# Patient Record
Sex: FEMALE | Race: WHITE | Employment: OTHER | URBAN - METROPOLITAN AREA
[De-identification: names, ages, dates, MRNs, and addresses within clinical notes are randomized per-mention and may not be internally consistent; named-entity substitution may affect disease eponyms.]

---

## 2018-01-15 ENCOUNTER — TELEPHONE (OUTPATIENT)
Dept: INTERNAL MEDICINE CLINIC | Facility: CLINIC | Age: 73
End: 2018-01-15

## 2018-02-22 ENCOUNTER — TELEPHONE (OUTPATIENT)
Dept: INTERNAL MEDICINE CLINIC | Facility: CLINIC | Age: 73
End: 2018-02-22

## 2018-02-22 NOTE — TELEPHONE ENCOUNTER
I left a voice message for Ms. Ribera that it was time for her annual physical and left our office phone number

## 2018-05-23 ENCOUNTER — TELEPHONE (OUTPATIENT)
Dept: INTERNAL MEDICINE CLINIC | Facility: CLINIC | Age: 73
End: 2018-05-23

## 2018-05-23 NOTE — TELEPHONE ENCOUNTER
5/23/18  MsLiss  Marychuytha was not able to commit to an appointment date today - she will call the office within the next 1-2 weeks to make the appt

## 2018-07-03 ENCOUNTER — APPOINTMENT (OUTPATIENT)
Dept: ULTRASOUND IMAGING | Facility: HOSPITAL | Age: 73
DRG: 417 | End: 2018-07-03
Attending: EMERGENCY MEDICINE
Payer: MEDICARE

## 2018-07-03 ENCOUNTER — HOSPITAL ENCOUNTER (INPATIENT)
Facility: HOSPITAL | Age: 73
LOS: 4 days | Discharge: HOME | DRG: 417 | End: 2018-07-07
Attending: EMERGENCY MEDICINE | Admitting: HOSPITALIST
Payer: MEDICARE

## 2018-07-03 ENCOUNTER — APPOINTMENT (OUTPATIENT)
Dept: MRI IMAGING | Facility: HOSPITAL | Age: 73
DRG: 417 | End: 2018-07-03
Attending: EMERGENCY MEDICINE
Payer: MEDICARE

## 2018-07-03 DIAGNOSIS — K85.10 ACUTE BILIARY PANCREATITIS, UNSPECIFIED COMPLICATION STATUS: ICD-10-CM

## 2018-07-03 DIAGNOSIS — K80.50 CHOLEDOCHOLITHIASIS: ICD-10-CM

## 2018-07-03 DIAGNOSIS — K81.0 ACUTE CHOLECYSTITIS: Primary | ICD-10-CM

## 2018-07-03 LAB
ALBUMIN SERPL BCP-MCNC: 3.1 G/DL (ref 3.5–4.8)
ALP SERPL-CCNC: 607 U/L (ref 32–100)
ALT SERPL-CCNC: 124 U/L (ref 14–54)
ANION GAP SERPL CALC-SCNC: 16 MMOL/L (ref 0–18)
AST SERPL-CCNC: 101 U/L (ref 15–41)
BASOPHILS # BLD: 0 K/UL (ref 0–0.2)
BASOPHILS NFR BLD: 0 %
BILIRUB DIRECT SERPL-MCNC: 3.4 MG/DL (ref 0–0.2)
BILIRUB SERPL-MCNC: 5.4 MG/DL (ref 0.3–1.2)
BUN SERPL-MCNC: 29 MG/DL (ref 8–20)
BUN/CREAT SERPL: 23.8 (ref 10–20)
CALCIUM SERPL-MCNC: 9.3 MG/DL (ref 8.5–10.5)
CHLORIDE SERPL-SCNC: 101 MMOL/L (ref 95–110)
CO2 SERPL-SCNC: 18 MMOL/L (ref 22–32)
CREAT SERPL-MCNC: 1.22 MG/DL (ref 0.5–1.5)
EOSINOPHIL # BLD: 0 K/UL (ref 0–0.7)
EOSINOPHIL NFR BLD: 0 %
ERYTHROCYTE [DISTWIDTH] IN BLOOD BY AUTOMATED COUNT: 15.7 % (ref 11–15)
GLUCOSE SERPL-MCNC: 140 MG/DL (ref 70–99)
HCT VFR BLD AUTO: 42.6 % (ref 35–48)
HGB BLD-MCNC: 14.1 G/DL (ref 12–16)
LIPASE SERPL-CCNC: 579 U/L (ref 22–51)
LYMPHOCYTES # BLD: 0.2 K/UL (ref 1–4)
LYMPHOCYTES NFR BLD: 5 %
MCH RBC QN AUTO: 28.9 PG (ref 27–32)
MCHC RBC AUTO-ENTMCNC: 33.1 G/DL (ref 32–37)
MCV RBC AUTO: 87.5 FL (ref 80–100)
MONOCYTES # BLD: 0 K/UL (ref 0–1)
MONOCYTES NFR BLD: 1 %
NEUTROPHILS # BLD AUTO: 3.6 K/UL (ref 1.8–7.7)
NEUTROPHILS NFR BLD: 94 %
OSMOLALITY UR CALC.SUM OF ELEC: 288 MOSM/KG (ref 275–295)
PLATELET # BLD AUTO: 148 K/UL (ref 140–400)
PMV BLD AUTO: 9 FL (ref 7.4–10.3)
POTASSIUM SERPL-SCNC: 2.8 MMOL/L (ref 3.3–5.1)
PROT SERPL-MCNC: 7.4 G/DL (ref 5.9–8.4)
RBC # BLD AUTO: 4.87 M/UL (ref 3.7–5.4)
SODIUM SERPL-SCNC: 135 MMOL/L (ref 136–144)
WBC # BLD AUTO: 3.8 K/UL (ref 4–11)

## 2018-07-03 PROCEDURE — 99222 1ST HOSP IP/OBS MODERATE 55: CPT | Performed by: INTERNAL MEDICINE

## 2018-07-03 PROCEDURE — 74183 MRI ABD W/O CNTR FLWD CNTR: CPT | Performed by: EMERGENCY MEDICINE

## 2018-07-03 PROCEDURE — 76376 3D RENDER W/INTRP POSTPROCES: CPT | Performed by: EMERGENCY MEDICINE

## 2018-07-03 PROCEDURE — 76705 ECHO EXAM OF ABDOMEN: CPT | Performed by: EMERGENCY MEDICINE

## 2018-07-03 PROCEDURE — 99223 1ST HOSP IP/OBS HIGH 75: CPT | Performed by: SURGERY

## 2018-07-03 PROCEDURE — 99223 1ST HOSP IP/OBS HIGH 75: CPT | Performed by: HOSPITALIST

## 2018-07-03 RX ORDER — SODIUM CHLORIDE 0.9 % (FLUSH) 0.9 %
3 SYRINGE (ML) INJECTION AS NEEDED
Status: DISCONTINUED | OUTPATIENT
Start: 2018-07-03 | End: 2018-07-05 | Stop reason: HOSPADM

## 2018-07-03 RX ORDER — SODIUM CHLORIDE 9 MG/ML
INJECTION, SOLUTION INTRAVENOUS
Status: COMPLETED
Start: 2018-07-03 | End: 2018-07-03

## 2018-07-03 RX ORDER — HYDROMORPHONE HYDROCHLORIDE 1 MG/ML
0.8 INJECTION, SOLUTION INTRAMUSCULAR; INTRAVENOUS; SUBCUTANEOUS EVERY 2 HOUR PRN
Status: DISCONTINUED | OUTPATIENT
Start: 2018-07-03 | End: 2018-07-05 | Stop reason: HOSPADM

## 2018-07-03 RX ORDER — HYDROMORPHONE HYDROCHLORIDE 1 MG/ML
0.4 INJECTION, SOLUTION INTRAMUSCULAR; INTRAVENOUS; SUBCUTANEOUS EVERY 2 HOUR PRN
Status: DISCONTINUED | OUTPATIENT
Start: 2018-07-03 | End: 2018-07-05 | Stop reason: HOSPADM

## 2018-07-03 RX ORDER — DEXTROSE, SODIUM CHLORIDE, AND POTASSIUM CHLORIDE 5; .45; .15 G/100ML; G/100ML; G/100ML
INJECTION INTRAVENOUS CONTINUOUS
Status: DISCONTINUED | OUTPATIENT
Start: 2018-07-03 | End: 2018-07-05 | Stop reason: HOSPADM

## 2018-07-03 RX ORDER — ONDANSETRON 2 MG/ML
4 INJECTION INTRAMUSCULAR; INTRAVENOUS EVERY 6 HOURS PRN
Status: DISCONTINUED | OUTPATIENT
Start: 2018-07-03 | End: 2018-07-07

## 2018-07-03 RX ORDER — HYDROMORPHONE HYDROCHLORIDE 1 MG/ML
0.2 INJECTION, SOLUTION INTRAMUSCULAR; INTRAVENOUS; SUBCUTANEOUS EVERY 2 HOUR PRN
Status: DISCONTINUED | OUTPATIENT
Start: 2018-07-03 | End: 2018-07-05 | Stop reason: HOSPADM

## 2018-07-03 RX ORDER — ACETAMINOPHEN 325 MG/1
650 TABLET ORAL EVERY 6 HOURS PRN
Status: DISCONTINUED | OUTPATIENT
Start: 2018-07-03 | End: 2018-07-07

## 2018-07-03 NOTE — ED PROVIDER NOTES
Patient Seen in: [facility] Emergency Department    History   Patient presents with:  Abdomen/Flank Pain (GI/)    Stated Complaint: fever    HPI    Patient is a 72-year-old female who states that over the last several weeks she has been feeling Musculoskeletal: Normal range of motion. Neurological: She is alert and oriented to person, place, and time. Skin: Skin is warm and dry. No rash noted. Skin markedly icteric   Nursing note and vitals reviewed.           ED Course     Labs Reviewed   B ------------------------------------------------------------      [facility]     Admission disposition: 7/3/2018  6:10 PM                 Disposition and Plan     Clinical Impression:  Acute cholecystitis  (primary encounter diagnosis)  Choledocholithiasis  Acute
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
## 2018-07-03 NOTE — H&P
[facility]    PATIENT'S NAME: Kyleigh Ramesh   ATTENDING PHYSICIAN: Rosa Malin MD   PATIENT ACCOUNT#:   [id number]    LOCATION:  [place] [address]  MEDICAL RECORD #:   [id number]       YOB: 1945  ADMISSION DATE:       07/03/ review of systems is negative. PHYSICAL EXAMINATION:    GENERAL:  Alert. Oriented to time, place, and person. Moderate distress. VITAL SIGNS:  Temperature 98.4, pulse 75, respiratory rate 20, blood pressure 128/72, pulse ox 95% on room air.   HEENT

## 2018-07-04 ENCOUNTER — APPOINTMENT (OUTPATIENT)
Dept: GENERAL RADIOLOGY | Facility: HOSPITAL | Age: 73
DRG: 417 | End: 2018-07-04
Attending: INTERNAL MEDICINE
Payer: MEDICARE

## 2018-07-04 ENCOUNTER — ANESTHESIA (OUTPATIENT)
Dept: ENDOSCOPY | Facility: HOSPITAL | Age: 73
DRG: 417 | End: 2018-07-04
Payer: MEDICARE

## 2018-07-04 ENCOUNTER — ANESTHESIA EVENT (OUTPATIENT)
Dept: ENDOSCOPY | Facility: HOSPITAL | Age: 73
DRG: 417 | End: 2018-07-04
Payer: MEDICARE

## 2018-07-04 ENCOUNTER — SURGERY (OUTPATIENT)
Age: 73
End: 2018-07-04

## 2018-07-04 LAB
ALBUMIN SERPL BCP-MCNC: 2.4 G/DL (ref 3.5–4.8)
ALBUMIN/GLOB SERPL: 0.7 {RATIO} (ref 1–2)
ALP SERPL-CCNC: 302 U/L (ref 32–100)
ALT SERPL-CCNC: 78 U/L (ref 14–54)
ANION GAP SERPL CALC-SCNC: 7 MMOL/L (ref 0–18)
AST SERPL-CCNC: 52 U/L (ref 15–41)
BASOPHILS # BLD: 0 K/UL (ref 0–0.2)
BASOPHILS NFR BLD: 0 %
BILIRUB SERPL-MCNC: 4.8 MG/DL (ref 0.3–1.2)
BUN SERPL-MCNC: 30 MG/DL (ref 8–20)
BUN/CREAT SERPL: 24 (ref 10–20)
CALCIUM SERPL-MCNC: 8.7 MG/DL (ref 8.5–10.5)
CHLORIDE SERPL-SCNC: 104 MMOL/L (ref 95–110)
CO2 SERPL-SCNC: 25 MMOL/L (ref 22–32)
CREAT SERPL-MCNC: 1.25 MG/DL (ref 0.5–1.5)
EOSINOPHIL # BLD: 0 K/UL (ref 0–0.7)
EOSINOPHIL NFR BLD: 0 %
ERYTHROCYTE [DISTWIDTH] IN BLOOD BY AUTOMATED COUNT: 15.6 % (ref 11–15)
GLOBULIN PLAS-MCNC: 3.4 G/DL (ref 2.5–3.7)
GLUCOSE SERPL-MCNC: 165 MG/DL (ref 70–99)
HCT VFR BLD AUTO: 35.4 % (ref 35–48)
HGB BLD-MCNC: 11.7 G/DL (ref 12–16)
INR BLD: 1.3 (ref 0.9–1.2)
LIPASE SERPL-CCNC: 106 U/L (ref 22–51)
LYMPHOCYTES # BLD: 0.9 K/UL (ref 1–4)
LYMPHOCYTES NFR BLD: 5 %
MCH RBC QN AUTO: 29 PG (ref 27–32)
MCHC RBC AUTO-ENTMCNC: 33.1 G/DL (ref 32–37)
MCV RBC AUTO: 87.5 FL (ref 80–100)
MONOCYTES # BLD: 0.6 K/UL (ref 0–1)
MONOCYTES NFR BLD: 3 %
NEUTROPHILS # BLD AUTO: 16.6 K/UL (ref 1.8–7.7)
NEUTROPHILS NFR BLD: 91 %
OSMOLALITY UR CALC.SUM OF ELEC: 292 MOSM/KG (ref 275–295)
PLATELET # BLD AUTO: 138 K/UL (ref 140–400)
PMV BLD AUTO: 10.2 FL (ref 7.4–10.3)
POTASSIUM SERPL-SCNC: 4.3 MMOL/L (ref 3.3–5.1)
PROT SERPL-MCNC: 5.8 G/DL (ref 5.9–8.4)
PROTHROMBIN TIME: 16 SECONDS (ref 11.8–14.5)
RBC # BLD AUTO: 4.05 M/UL (ref 3.7–5.4)
SODIUM SERPL-SCNC: 136 MMOL/L (ref 136–144)
WBC # BLD AUTO: 18.2 K/UL (ref 4–11)

## 2018-07-04 PROCEDURE — 74330 X-RAY BILE/PANC ENDOSCOPY: CPT | Performed by: INTERNAL MEDICINE

## 2018-07-04 PROCEDURE — 99232 SBSQ HOSP IP/OBS MODERATE 35: CPT | Performed by: HOSPITALIST

## 2018-07-04 PROCEDURE — 43264 ERCP REMOVE DUCT CALCULI: CPT | Performed by: INTERNAL MEDICINE

## 2018-07-04 PROCEDURE — BF101ZZ FLUOROSCOPY OF BILE DUCTS USING LOW OSMOLAR CONTRAST: ICD-10-PCS | Performed by: INTERNAL MEDICINE

## 2018-07-04 PROCEDURE — 0FC98ZZ EXTIRPATION OF MATTER FROM COMMON BILE DUCT, VIA NATURAL OR ARTIFICIAL OPENING ENDOSCOPIC: ICD-10-PCS | Performed by: INTERNAL MEDICINE

## 2018-07-04 PROCEDURE — 43262 ENDO CHOLANGIOPANCREATOGRAPH: CPT | Performed by: INTERNAL MEDICINE

## 2018-07-04 PROCEDURE — 99231 SBSQ HOSP IP/OBS SF/LOW 25: CPT | Performed by: SURGERY

## 2018-07-04 RX ORDER — HALOPERIDOL 5 MG/ML
0.25 INJECTION INTRAMUSCULAR ONCE AS NEEDED
Status: DISCONTINUED | OUTPATIENT
Start: 2018-07-04 | End: 2018-07-04 | Stop reason: HOSPADM

## 2018-07-04 RX ORDER — HYDROMORPHONE HYDROCHLORIDE 1 MG/ML
0.4 INJECTION, SOLUTION INTRAMUSCULAR; INTRAVENOUS; SUBCUTANEOUS EVERY 5 MIN PRN
Status: DISCONTINUED | OUTPATIENT
Start: 2018-07-04 | End: 2018-07-04 | Stop reason: HOSPADM

## 2018-07-04 RX ORDER — HYDROCODONE BITARTRATE AND ACETAMINOPHEN 5; 325 MG/1; MG/1
2 TABLET ORAL AS NEEDED
Status: DISCONTINUED | OUTPATIENT
Start: 2018-07-04 | End: 2018-07-04 | Stop reason: HOSPADM

## 2018-07-04 RX ORDER — DEXAMETHASONE SODIUM PHOSPHATE 4 MG/ML
VIAL (ML) INJECTION AS NEEDED
Status: DISCONTINUED | OUTPATIENT
Start: 2018-07-04 | End: 2018-07-04 | Stop reason: SURG

## 2018-07-04 RX ORDER — SODIUM CHLORIDE 9 MG/ML
INJECTION, SOLUTION INTRAVENOUS
Status: COMPLETED
Start: 2018-07-04 | End: 2018-07-04

## 2018-07-04 RX ORDER — HYDROCODONE BITARTRATE AND ACETAMINOPHEN 5; 325 MG/1; MG/1
1 TABLET ORAL AS NEEDED
Status: DISCONTINUED | OUTPATIENT
Start: 2018-07-04 | End: 2018-07-04 | Stop reason: HOSPADM

## 2018-07-04 RX ORDER — HYDROMORPHONE HYDROCHLORIDE 1 MG/ML
0.6 INJECTION, SOLUTION INTRAMUSCULAR; INTRAVENOUS; SUBCUTANEOUS EVERY 5 MIN PRN
Status: DISCONTINUED | OUTPATIENT
Start: 2018-07-04 | End: 2018-07-04 | Stop reason: HOSPADM

## 2018-07-04 RX ORDER — SODIUM CHLORIDE, SODIUM LACTATE, POTASSIUM CHLORIDE, CALCIUM CHLORIDE 600; 310; 30; 20 MG/100ML; MG/100ML; MG/100ML; MG/100ML
INJECTION, SOLUTION INTRAVENOUS CONTINUOUS PRN
Status: DISCONTINUED | OUTPATIENT
Start: 2018-07-04 | End: 2018-07-04 | Stop reason: SURG

## 2018-07-04 RX ORDER — HYDROMORPHONE HYDROCHLORIDE 1 MG/ML
0.2 INJECTION, SOLUTION INTRAMUSCULAR; INTRAVENOUS; SUBCUTANEOUS EVERY 5 MIN PRN
Status: DISCONTINUED | OUTPATIENT
Start: 2018-07-04 | End: 2018-07-04 | Stop reason: HOSPADM

## 2018-07-04 RX ORDER — EPHEDRINE SULFATE 50 MG/ML
INJECTION, SOLUTION INTRAVENOUS AS NEEDED
Status: DISCONTINUED | OUTPATIENT
Start: 2018-07-04 | End: 2018-07-04 | Stop reason: SURG

## 2018-07-04 RX ORDER — ONDANSETRON 2 MG/ML
4 INJECTION INTRAMUSCULAR; INTRAVENOUS ONCE AS NEEDED
Status: DISCONTINUED | OUTPATIENT
Start: 2018-07-04 | End: 2018-07-04 | Stop reason: HOSPADM

## 2018-07-04 RX ORDER — NALOXONE HYDROCHLORIDE 0.4 MG/ML
80 INJECTION, SOLUTION INTRAMUSCULAR; INTRAVENOUS; SUBCUTANEOUS AS NEEDED
Status: DISCONTINUED | OUTPATIENT
Start: 2018-07-04 | End: 2018-07-04 | Stop reason: HOSPADM

## 2018-07-04 RX ORDER — SODIUM CHLORIDE, SODIUM LACTATE, POTASSIUM CHLORIDE, CALCIUM CHLORIDE 600; 310; 30; 20 MG/100ML; MG/100ML; MG/100ML; MG/100ML
INJECTION, SOLUTION INTRAVENOUS CONTINUOUS
Status: DISCONTINUED | OUTPATIENT
Start: 2018-07-04 | End: 2018-07-05 | Stop reason: HOSPADM

## 2018-07-04 RX ADMIN — SODIUM CHLORIDE, SODIUM LACTATE, POTASSIUM CHLORIDE, CALCIUM CHLORIDE: 600; 310; 30; 20 INJECTION, SOLUTION INTRAVENOUS at 11:00:00

## 2018-07-04 RX ADMIN — DEXAMETHASONE SODIUM PHOSPHATE 4 MG: 4 MG/ML VIAL (ML) INJECTION at 11:20:00

## 2018-07-04 RX ADMIN — ONDANSETRON 4 MG: 2 INJECTION INTRAMUSCULAR; INTRAVENOUS at 11:20:00

## 2018-07-04 RX ADMIN — EPHEDRINE SULFATE 15 MG: 50 INJECTION, SOLUTION INTRAVENOUS at 11:15:00

## 2018-07-04 NOTE — ANESTHESIA PROCEDURE NOTES
Airway  Urgency: elective      General Information and Staff    Patient location during procedure: OR  Anesthesiologist: Lubna Payne  Performed: anesthesiologist     Indications and Patient Condition  Indications for airway management: anesthesia  Se

## 2018-07-04 NOTE — H&P
History & Physical Examination    Patient Name: Sarai Hammonds  MRN: [id number]  CSN: [id number]  YOB: 1945    Diagnosis: choledocholithiasis      No prescriptions prior to admission.     Current Facility-Administered Medications:  [MAR Hold] leading to prolonged hospital stay and/or surgical intervention. All questions were answered to the patient’s satisfaction. The patient elected to proceed with ERCP with intervention [i.e. stent, sphincterotomy, etc.] as indicated.     I have reviewed the H

## 2018-07-04 NOTE — CONSULTS
[facility]   Gastroenterology Consultation Note     Lynette Carrera  Patient Status:    Emergency  Date of Admission:         7/3/2018, Hospital day #0  Attending:   Makenzie Flores MD  PCP:     Venice Sierra MD    Reason for Consu non-distended  Ext: no lower extremity swelling  Neuro: Alert, Oriented X 3  Skin: no rashes, bruises; + jaundice  Psych: normal affect    Laboratory Data:    Lab Results  Component Value Date   WBC 3.8 07/03/2018   HGB 14.1 07/03/2018   HCT 42.6 07/03/201 further evidence/evaluaiton of pancreatitis, common bile duct stone presence and other potential pathology like malignancy. I discussed with her and her  that pending the above evaluation she may require ERCP.      We preliminarily discussed this

## 2018-07-04 NOTE — PROGRESS NOTES
[facility] Pharmacy Note:  Renal Adjustment for Meropenem (MERREM) 500 mg in sodium chloride 0.9% 100 mL     Leeann Goodman is a 68year old female who has been prescribed  Meropenem (MERREM) 500 mg in sodium chloride 0.9% 100 mL every 8 hrs.   CrCl is estimated

## 2018-07-04 NOTE — PROGRESS NOTES
[facility]    Progress Note    [facility] Status:  Inpatient    1945 MRN [id number]   Location [facility] 4W/SW/SE Attending Anette Begum MD   Hosp Day # 1 PCP Dom Jaimes MD     Assessment and Plan:  (H) 07/04/2018       Us Gallbladder (cpt=76705)    Result Date: 7/3/2018  CONCLUSION:  1. Cholelithiasis and gallbladder sludge. Borderline thickened gallbladder wall. Negative sonographic Jaffe sign.  Findings may reflect chronic gallbladder inf

## 2018-07-04 NOTE — ANESTHESIA PREPROCEDURE EVALUATION
Anesthesia PreOp Note    HPI:     Lynette Carrera is a 68year old female who presents for preoperative consultation requested by: Juvenal Benites MD    Date of Surgery: 7/3/2018 - 7/4/2018    Procedure(s):  ENDOSCOPIC RETROGRADE Terry Cheney injection 0.2 mg 0.2 mg Intravenous Q2H PRN Eddie Young MD     Or         Sumanth Mercer Hold] HYDROmorphone HCl (DILAUDID) 1 MG/ML injection 0.4 mg 0.4 mg Intravenous Q2H PRN Eddie Young MD 0.4 mg at 07/03/18 2136    Or         [MAR Hold] HYDROmorphone HC oxygen saturation is 97%.     07/03/18  2114 07/04/18  0455 07/04/18  0914 07/04/18  0928   BP: 150/68 123/74 126/60 133/68   BP Location: Left arm Left arm Right arm Left arm   Pulse: 88 70 69 61   Resp: 18 18 18 18   Temp: 98.7 °F (37.1 °C) 98.3 °F (36.8

## 2018-07-04 NOTE — OPERATIVE REPORT
Endoscopic retrograde cholangio-pancreatography (ERCP) report    Ramon Harley     1945 Age 68year old   PCP Homer Caban MD Endoscopist Dickson Harris MD     Date of procedure: 18    Procedure: ERCP w/ sphincterotomy and sludge/sto duct cannulation. A biliary sphincterotomy was performed without post-sphincterotomy bleeding. The sphinctertome was removed and exchanged for a 12-15 mm balloon extraction catheter. Cholangiogram was performed.  The intrahepatics appeared normal. The bile

## 2018-07-04 NOTE — ANESTHESIA POSTPROCEDURE EVALUATION
Patient: Alejandra Cabezas    Procedure Summary     Date:  07/04/18 Room / Location:  [facility] ENDOSCOPY 01 / [facility] ENDOSCOPY    Anesthesia Start:  7734 Anesthesia Stop:      Procedure:  ENDOSCOPIC RETROGRADE CHOLANGIOPANCREATOGRAPHY (ERCP) (N/A ) Diagnosis:  (Abd

## 2018-07-04 NOTE — PROGRESS NOTES
[facility]    Progress Note    Bhavin Wright Marychuyforgen Patient Status:  Inpatient    1945 MRN [id number]   Location [facility] 4W/SW/SE Attending Bang Che MD   Hosp Day # 1 PCP Hannah Riley MD       Subjective:   Bhavin Wright 0.9% 100 mL MBP, 500 mg, Intravenous, Q12H    Assessment and Plan:     Acute cholecystitis  With cholelithiasis, choledocholithiasis  Obstructive jaundice  Improved s/p ERCP today, underwent sphincterotomy, sludge/stone removal   Cont to trend liver enzyme (w+w0)(cpt=74183/35265)    Result Date: 7/3/2018  CONCLUSION:   Extensive cholelithiasis without MR evidence of acute cholecystitis.   Choledocholithiasis at the ampulla of Vater resulting in severe dilation of the common bile duct and mild dilation of the

## 2018-07-04 NOTE — CONSULTS
[facility]    PATIENT'S NAME: Manette Net   ATTENDING PHYSICIAN: Mary Niño MD   CONSULTING PHYSICIAN: Kyleigh Galvan MD   PATIENT ACCOUNT#:   [id number]    [facility] [address] [facility] #:   [id number]       DATE OF BIRTH pleasant middle-aged female in mild distress. HEENT:  Her sclerae are icteric. Mucous membranes are dry. NECK:  Supple, without lymphadenopathy. LUNGS:  Clear. HEART:  Regular. ABDOMEN:  Protuberant but soft.   She is tender, with some fullness in th Consider repeating her EKG and a screening chest x-ray at some point. She may be a candidate for a laparoscopic cholecystectomy, timing to be determined.   Encourage regular medical followup as an outpatient for catching up on other routine screenings and

## 2018-07-05 ENCOUNTER — SURGERY (OUTPATIENT)
Age: 73
End: 2018-07-05

## 2018-07-05 ENCOUNTER — APPOINTMENT (OUTPATIENT)
Dept: CV DIAGNOSTICS | Facility: HOSPITAL | Age: 73
DRG: 417 | End: 2018-07-05
Attending: HOSPITALIST
Payer: MEDICARE

## 2018-07-05 ENCOUNTER — ANESTHESIA (OUTPATIENT)
Dept: SURGERY | Facility: HOSPITAL | Age: 73
DRG: 417 | End: 2018-07-05
Payer: MEDICARE

## 2018-07-05 ENCOUNTER — ANESTHESIA EVENT (OUTPATIENT)
Dept: SURGERY | Facility: HOSPITAL | Age: 73
DRG: 417 | End: 2018-07-05
Payer: MEDICARE

## 2018-07-05 LAB
ALBUMIN SERPL BCP-MCNC: 2.1 G/DL (ref 3.5–4.8)
ALBUMIN/GLOB SERPL: 0.7 {RATIO} (ref 1–2)
ALP SERPL-CCNC: 251 U/L (ref 32–100)
ALT SERPL-CCNC: 53 U/L (ref 14–54)
ANION GAP SERPL CALC-SCNC: 2 MMOL/L (ref 0–18)
AST SERPL-CCNC: 27 U/L (ref 15–41)
BASOPHILS # BLD: 0 K/UL (ref 0–0.2)
BASOPHILS NFR BLD: 0 %
BILIRUB SERPL-MCNC: 2.3 MG/DL (ref 0.3–1.2)
BUN SERPL-MCNC: 22 MG/DL (ref 8–20)
BUN/CREAT SERPL: 23.2 (ref 10–20)
CALCIUM SERPL-MCNC: 8.6 MG/DL (ref 8.5–10.5)
CHLORIDE SERPL-SCNC: 107 MMOL/L (ref 95–110)
CO2 SERPL-SCNC: 26 MMOL/L (ref 22–32)
CREAT SERPL-MCNC: 0.95 MG/DL (ref 0.5–1.5)
EOSINOPHIL # BLD: 0 K/UL (ref 0–0.7)
EOSINOPHIL NFR BLD: 0 %
ERYTHROCYTE [DISTWIDTH] IN BLOOD BY AUTOMATED COUNT: 16 % (ref 11–15)
GLOBULIN PLAS-MCNC: 3 G/DL (ref 2.5–3.7)
GLUCOSE SERPL-MCNC: 160 MG/DL (ref 70–99)
HCT VFR BLD AUTO: 32.2 % (ref 35–48)
HGB BLD-MCNC: 10.6 G/DL (ref 12–16)
LYMPHOCYTES # BLD: 1 K/UL (ref 1–4)
LYMPHOCYTES NFR BLD: 8 %
MCH RBC QN AUTO: 28.5 PG (ref 27–32)
MCHC RBC AUTO-ENTMCNC: 32.9 G/DL (ref 32–37)
MCV RBC AUTO: 86.7 FL (ref 80–100)
MONOCYTES # BLD: 0.9 K/UL (ref 0–1)
MONOCYTES NFR BLD: 6 %
NEUTROPHILS # BLD AUTO: 11.6 K/UL (ref 1.8–7.7)
NEUTROPHILS NFR BLD: 86 %
OSMOLALITY UR CALC.SUM OF ELEC: 287 MOSM/KG (ref 275–295)
PLATELET # BLD AUTO: 131 K/UL (ref 140–400)
PMV BLD AUTO: 10.1 FL (ref 7.4–10.3)
POTASSIUM SERPL-SCNC: 4.4 MMOL/L (ref 3.3–5.1)
PROT SERPL-MCNC: 5.1 G/DL (ref 5.9–8.4)
RBC # BLD AUTO: 3.71 M/UL (ref 3.7–5.4)
SODIUM SERPL-SCNC: 135 MMOL/L (ref 136–144)
WBC # BLD AUTO: 13.5 K/UL (ref 4–11)

## 2018-07-05 PROCEDURE — 47562 LAPAROSCOPIC CHOLECYSTECTOMY: CPT | Performed by: SURGERY

## 2018-07-05 PROCEDURE — 0FT44ZZ RESECTION OF GALLBLADDER, PERCUTANEOUS ENDOSCOPIC APPROACH: ICD-10-PCS | Performed by: SURGERY

## 2018-07-05 PROCEDURE — 93308 TTE F-UP OR LMTD: CPT | Performed by: HOSPITALIST

## 2018-07-05 PROCEDURE — 99233 SBSQ HOSP IP/OBS HIGH 50: CPT | Performed by: HOSPITALIST

## 2018-07-05 RX ORDER — HYDROMORPHONE HYDROCHLORIDE 1 MG/ML
0.8 INJECTION, SOLUTION INTRAMUSCULAR; INTRAVENOUS; SUBCUTANEOUS EVERY 2 HOUR PRN
Status: DISCONTINUED | OUTPATIENT
Start: 2018-07-05 | End: 2018-07-07

## 2018-07-05 RX ORDER — LIDOCAINE HYDROCHLORIDE 40 MG/ML
SOLUTION TOPICAL AS NEEDED
Status: DISCONTINUED | OUTPATIENT
Start: 2018-07-05 | End: 2018-07-05 | Stop reason: SURG

## 2018-07-05 RX ORDER — ROCURONIUM BROMIDE 10 MG/ML
INJECTION, SOLUTION INTRAVENOUS AS NEEDED
Status: DISCONTINUED | OUTPATIENT
Start: 2018-07-05 | End: 2018-07-05 | Stop reason: SURG

## 2018-07-05 RX ORDER — SODIUM CHLORIDE, SODIUM LACTATE, POTASSIUM CHLORIDE, CALCIUM CHLORIDE 600; 310; 30; 20 MG/100ML; MG/100ML; MG/100ML; MG/100ML
INJECTION, SOLUTION INTRAVENOUS CONTINUOUS
Status: DISCONTINUED | OUTPATIENT
Start: 2018-07-05 | End: 2018-07-07

## 2018-07-05 RX ORDER — HYDROMORPHONE HYDROCHLORIDE 1 MG/ML
1.2 INJECTION, SOLUTION INTRAMUSCULAR; INTRAVENOUS; SUBCUTANEOUS EVERY 2 HOUR PRN
Status: DISCONTINUED | OUTPATIENT
Start: 2018-07-05 | End: 2018-07-07

## 2018-07-05 RX ORDER — SODIUM CHLORIDE 9 MG/ML
INJECTION, SOLUTION INTRAVENOUS
Status: COMPLETED
Start: 2018-07-05 | End: 2018-07-05

## 2018-07-05 RX ORDER — HYDROMORPHONE HYDROCHLORIDE 1 MG/ML
0.4 INJECTION, SOLUTION INTRAMUSCULAR; INTRAVENOUS; SUBCUTANEOUS EVERY 2 HOUR PRN
Status: DISCONTINUED | OUTPATIENT
Start: 2018-07-05 | End: 2018-07-07

## 2018-07-05 RX ORDER — NALOXONE HYDROCHLORIDE 0.4 MG/ML
80 INJECTION, SOLUTION INTRAMUSCULAR; INTRAVENOUS; SUBCUTANEOUS AS NEEDED
Status: DISCONTINUED | OUTPATIENT
Start: 2018-07-05 | End: 2018-07-05 | Stop reason: HOSPADM

## 2018-07-05 RX ORDER — SODIUM CHLORIDE, SODIUM LACTATE, POTASSIUM CHLORIDE, CALCIUM CHLORIDE 600; 310; 30; 20 MG/100ML; MG/100ML; MG/100ML; MG/100ML
INJECTION, SOLUTION INTRAVENOUS CONTINUOUS
Status: DISCONTINUED | OUTPATIENT
Start: 2018-07-05 | End: 2018-07-05 | Stop reason: HOSPADM

## 2018-07-05 RX ORDER — ONDANSETRON 2 MG/ML
4 INJECTION INTRAMUSCULAR; INTRAVENOUS ONCE AS NEEDED
Status: DISCONTINUED | OUTPATIENT
Start: 2018-07-05 | End: 2018-07-05 | Stop reason: HOSPADM

## 2018-07-05 RX ORDER — LIDOCAINE HYDROCHLORIDE 10 MG/ML
INJECTION, SOLUTION EPIDURAL; INFILTRATION; INTRACAUDAL; PERINEURAL AS NEEDED
Status: DISCONTINUED | OUTPATIENT
Start: 2018-07-05 | End: 2018-07-05 | Stop reason: SURG

## 2018-07-05 RX ORDER — HYDROMORPHONE HYDROCHLORIDE 1 MG/ML
0.4 INJECTION, SOLUTION INTRAMUSCULAR; INTRAVENOUS; SUBCUTANEOUS EVERY 5 MIN PRN
Status: DISCONTINUED | OUTPATIENT
Start: 2018-07-05 | End: 2018-07-05 | Stop reason: HOSPADM

## 2018-07-05 RX ORDER — HYDROCODONE BITARTRATE AND ACETAMINOPHEN 5; 325 MG/1; MG/1
1 TABLET ORAL AS NEEDED
Status: DISCONTINUED | OUTPATIENT
Start: 2018-07-05 | End: 2018-07-05 | Stop reason: HOSPADM

## 2018-07-05 RX ORDER — HEPARIN SODIUM 5000 [USP'U]/ML
5000 INJECTION, SOLUTION INTRAVENOUS; SUBCUTANEOUS EVERY 12 HOURS SCHEDULED
Status: DISCONTINUED | OUTPATIENT
Start: 2018-07-06 | End: 2018-07-07

## 2018-07-05 RX ORDER — HYDROCODONE BITARTRATE AND ACETAMINOPHEN 5; 325 MG/1; MG/1
2 TABLET ORAL EVERY 4 HOURS PRN
Status: DISCONTINUED | OUTPATIENT
Start: 2018-07-05 | End: 2018-07-07

## 2018-07-05 RX ORDER — SODIUM CHLORIDE, SODIUM LACTATE, POTASSIUM CHLORIDE, CALCIUM CHLORIDE 600; 310; 30; 20 MG/100ML; MG/100ML; MG/100ML; MG/100ML
INJECTION, SOLUTION INTRAVENOUS CONTINUOUS PRN
Status: DISCONTINUED | OUTPATIENT
Start: 2018-07-05 | End: 2018-07-05 | Stop reason: SURG

## 2018-07-05 RX ORDER — BUPIVACAINE HYDROCHLORIDE AND EPINEPHRINE 5; 5 MG/ML; UG/ML
INJECTION, SOLUTION PERINEURAL AS NEEDED
Status: DISCONTINUED | OUTPATIENT
Start: 2018-07-05 | End: 2018-07-05 | Stop reason: HOSPADM

## 2018-07-05 RX ORDER — HYDROCODONE BITARTRATE AND ACETAMINOPHEN 5; 325 MG/1; MG/1
2 TABLET ORAL AS NEEDED
Status: DISCONTINUED | OUTPATIENT
Start: 2018-07-05 | End: 2018-07-05 | Stop reason: HOSPADM

## 2018-07-05 RX ORDER — HYDROCODONE BITARTRATE AND ACETAMINOPHEN 5; 325 MG/1; MG/1
1 TABLET ORAL EVERY 4 HOURS PRN
Status: DISCONTINUED | OUTPATIENT
Start: 2018-07-05 | End: 2018-07-07

## 2018-07-05 RX ORDER — HYDROMORPHONE HYDROCHLORIDE 1 MG/ML
0.2 INJECTION, SOLUTION INTRAMUSCULAR; INTRAVENOUS; SUBCUTANEOUS EVERY 5 MIN PRN
Status: DISCONTINUED | OUTPATIENT
Start: 2018-07-05 | End: 2018-07-05 | Stop reason: HOSPADM

## 2018-07-05 RX ORDER — NEOSTIGMINE METHYLSULFATE 0.5 MG/ML
INJECTION INTRAVENOUS AS NEEDED
Status: DISCONTINUED | OUTPATIENT
Start: 2018-07-05 | End: 2018-07-05 | Stop reason: SURG

## 2018-07-05 RX ORDER — GLYCOPYRROLATE 0.2 MG/ML
INJECTION INTRAMUSCULAR; INTRAVENOUS AS NEEDED
Status: DISCONTINUED | OUTPATIENT
Start: 2018-07-05 | End: 2018-07-05 | Stop reason: SURG

## 2018-07-05 RX ADMIN — GLYCOPYRROLATE 1 MG: 0.2 INJECTION INTRAMUSCULAR; INTRAVENOUS at 16:46:00

## 2018-07-05 RX ADMIN — GLYCOPYRROLATE 0.2 MG: 0.2 INJECTION INTRAMUSCULAR; INTRAVENOUS at 15:27:00

## 2018-07-05 RX ADMIN — LIDOCAINE HYDROCHLORIDE 50 MG: 10 INJECTION, SOLUTION EPIDURAL; INFILTRATION; INTRACAUDAL; PERINEURAL at 15:27:00

## 2018-07-05 RX ADMIN — LIDOCAINE HYDROCHLORIDE 4 ML: 40 SOLUTION TOPICAL at 15:27:00

## 2018-07-05 RX ADMIN — SODIUM CHLORIDE, SODIUM LACTATE, POTASSIUM CHLORIDE, CALCIUM CHLORIDE: 600; 310; 30; 20 INJECTION, SOLUTION INTRAVENOUS at 15:27:00

## 2018-07-05 RX ADMIN — ONDANSETRON 4 MG: 2 INJECTION INTRAMUSCULAR; INTRAVENOUS at 15:27:00

## 2018-07-05 RX ADMIN — SODIUM CHLORIDE, SODIUM LACTATE, POTASSIUM CHLORIDE, CALCIUM CHLORIDE: 600; 310; 30; 20 INJECTION, SOLUTION INTRAVENOUS at 17:01:00

## 2018-07-05 RX ADMIN — ROCURONIUM BROMIDE 10 MG: 10 INJECTION, SOLUTION INTRAVENOUS at 15:27:00

## 2018-07-05 RX ADMIN — NEOSTIGMINE METHYLSULFATE 5 MG: 0.5 INJECTION INTRAVENOUS at 16:46:00

## 2018-07-05 RX ADMIN — ROCURONIUM BROMIDE 30 MG: 10 INJECTION, SOLUTION INTRAVENOUS at 15:40:00

## 2018-07-05 NOTE — ANESTHESIA POSTPROCEDURE EVALUATION
Patient: Eddye Roodlfo    Procedure Summary     Date:  07/05/18 Room / Location:  [address] [place] [address] / [address] [place] [address] MAIN OR    Anesthesia Start:  6261 Anesthesia Stop:  3718    Procedure:  LAPAROSCOPIC CHOLECYSTECTOMY (N/A Abdomen) Diagnosis:       Acute cholecystit

## 2018-07-05 NOTE — OPERATIVE REPORT
[facility]    PATIENT'S NAME: Dwayne Gagnon   ATTENDING PHYSICIAN: Shanice Venegas MD   OPERATING PHYSICIAN: Anne Marie Burns MD   PATIENT ACCOUNT#:   [id number]    LOCATION:  [address] [place] [address] #:   [id number]       DATE OF BIRTH: adhesions around the umbilicus. No abdominal viscera were seen here, and there was no evidence for injury. There were no other adhesions seen in the lower abdomen. The gallbladder was enlarged and somewhat congested.   The gallbladder was moderately dist

## 2018-07-05 NOTE — PROGRESS NOTES
[facility]    Progress Note    Lupis Ribera Patient Status:  Inpatient    1945 MRN [id number]   Location [facility] 4W/SW/SE Attending Serena Baez MD   Hosp Day # 2 PCP Silvia Lord MD       Subjective:   Lupis Hickman PRN  •  Prochlorperazine Edisylate (COMPAZINE) injection 5 mg, 5 mg, Intravenous, Once PRN  •  Naloxone HCl (NARCAN) 0.4 MG/ML injection 80 mcg, 80 mcg, Intravenous, PRN  •  lactated ringers infusion, , Intravenous, Continuous  •  [MAR Hold] Normal Saline Ekg  Initial ekg on admission with sinus tachy and RBBB, repeated this AM showing bradycardia + RBBB  Will obtain 2d echo to evaluate further, if EF preserved and no WMA will proceed with surgery  Will consult cardiology for further evaluation    DVT PPx: Dictated by (CST): Kyara Crawford MD on 7/04/2018 at 12:56     Approved by (CST): Kyara Crawford MD on 7/04/2018 at 12:57          Mri Abdomen&mrcp W/3d (w+w0)(cpt=74183/84536)    Result Date: 7/3/2018  CONCLUSION:   Extensive cholelithiasis without MR evidenc

## 2018-07-05 NOTE — ANESTHESIA PROCEDURE NOTES
Airway  Date/Time: 7/5/2018 3:31 PM  Urgency: elective    Airway not difficult    General Information and Staff    Patient location during procedure: OR  Anesthesiologist: Edward Burris  Performed: anesthesiologist     Indications and Patient Condition  Maria Victoria

## 2018-07-05 NOTE — ANESTHESIA PREPROCEDURE EVALUATION
Anesthesia PreOp Note    HPI:     Darrell Lancaster is a 68year old female who presents for preoperative consultation requested by: Nilson Henson MD    Date of Surgery: 7/3/2018 - 7/5/2018    Procedure(s):  LAPAROSCOPIC CHOLECYSTECTOMY  Indication: Acute Q2H PRN Makenzie Flores MD     Meropenem (MERREM) 500 mg in sodium chloride 0.9% 100 mL  mg Intravenous Q12H Makenzie Flores MD Last Rate: 33.3 mL/hr at 07/05/18 1247 500 mg at 07/05/18 1247     No current Epic-ordered outpatient prescriptions on Oral Oral Oral Oral   SpO2: 98% 98% 98% 98%   Weight:       Height:            Anesthesia ROS/Med Hx and Physical Exam     Patient summary reviewed and Nursing notes reviewed    Airway   Mallampati: II  TM distance: >3 FB  Dental      Comment: Only 4 upper

## 2018-07-05 NOTE — CONSULTS
[facility]    Cardiology Consultation  [facility]    Miryam Ribera Patient Status:  Inpatient    1945 MRN [id number]   Location [facility] 4W/SW/SE Attending Margot Reno MD   Hosp Day # 2 PCP Intravenous Q2H PRN   Or      HYDROmorphone HCl (DILAUDID) 1 MG/ML injection 0.8 mg 0.8 mg Intravenous Q2H PRN   Meropenem (MERREM) 500 mg in sodium chloride 0.9% 100 mL  mg Intravenous Q12H       No prescriptions prior to admission.     Allergies 07/05/2018    (H) 07/05/2018   CA 8.6 07/05/2018   ALB 2.1 (L) 07/05/2018   ALKPHO 251 (H) 07/05/2018   TP 5.1 (L) 07/05/2018   AST 27 07/05/2018   ALT 53 07/05/2018   INR 1.3 (H) 07/04/2018   PTP 16.0 (H) 07/04/2018    (H) 07/04/2018

## 2018-07-05 NOTE — BRIEF OP NOTE
Pre-Operative Diagnosis: Acute cholecystitis [K81.0]     Post-Operative Diagnosis: Acute cholecystitis [K81.0]      Procedure Performed:   Procedure(s):  laparoscopic cholecystectomy    Surgeon(s) and Role:     Jerry Faria MD - Primary    Assistant(s)

## 2018-07-05 NOTE — PROGRESS NOTES
[facility]    Progress Note    Nelly Ribera Patient Status:  Inpatient    1945 MRN [id number]   Location [facility] 4W/SW/SE Attending Dalia Fonseca MD   Hosp Day # 2 PCP Adrian Hurley MD     Assessment and Plan: 07/05/2018   CO2 26 07/05/2018    (H) 07/05/2018   CA 8.6 07/05/2018   ALB 2.1 (L) 07/05/2018   ALKPHO 251 (H) 07/05/2018   BILT 2.3 (H) 07/05/2018   TP 5.1 (L) 07/05/2018   AST 27 07/05/2018   ALT 53 07/05/2018   INR 1.3 (H) 07/04/2018    (H 20:34 by Ellyn Morejon MD  7/5/2018

## 2018-07-06 PROCEDURE — 99232 SBSQ HOSP IP/OBS MODERATE 35: CPT | Performed by: HOSPITALIST

## 2018-07-06 PROCEDURE — 99232 SBSQ HOSP IP/OBS MODERATE 35: CPT | Performed by: INTERNAL MEDICINE

## 2018-07-06 RX ORDER — HYDROCODONE BITARTRATE AND ACETAMINOPHEN 5; 325 MG/1; MG/1
1 TABLET ORAL EVERY 4 HOURS PRN
Qty: 20 TABLET | Refills: 0 | Status: SHIPPED | OUTPATIENT
Start: 2018-07-06 | End: 2018-07-17

## 2018-07-06 NOTE — PROGRESS NOTES
[facility]    Progress Note    Collins Wyman Marychuytha Patient Status:  Inpatient    1945 MRN [id number]   Location [facility] 4W/SW/SE Attending Ward Arora MD   Hosp Day # 3 PCP Homer Caban MD       Assessment and Plan: 131*       Recent Labs   Lab  07/03/18   1515  07/04/18   0453  07/05/18   0508   GLU  140*  165*  160*   BUN  29*  30*  22*   CREATSERUM  1.22  1.25  0.95   GFRAA  51*  49*  >60   GFRNAA  44*  43*  60   CA  9.3  8.7  8.6   NA  135*  136  135*   K  2.8*  4

## 2018-07-06 NOTE — PROGRESS NOTES
[facility]    Progress Note    Sofía Ribera Patient Status:  Inpatient    1945 MRN [id number]   Location [facility] 4W/SW/SE Attending Leena Hopper MD   Hosp Day # 3 PCP Angela Romero MD     Assessment and Plan: 07/05/2018   ALT 53 07/05/2018   INR 1.3 (H) 07/04/2018    (H) 07/04/2018       Xr Bile/pancreas Endoscopy (cpt=74330)    Result Date: 7/4/2018  CONCLUSION: Intraoperative cholangiogram with ERCP.  Please see final operative report for further detail

## 2018-07-06 NOTE — PROGRESS NOTES
[facility] Pharmacy Note:  Renal Adjustment for meropenem (MERREM)    Zoya Peterson is a 68year old female who has been prescribed meropenem (MERREM) 500 mg every 12 hrs. CrCl is estimated creatinine clearance is 51.3 mL/min (based on SCr of 0.95 mg/dL).  so

## 2018-07-06 NOTE — TRANSITION NOTE
For Cardiology Office:    See for preop clearance. ECG with RBBB  Echo:  Study Conclusions  1. Left ventricle: The cavity size was normal. Systolic function     was normal. The estimated ejection fraction was 55%.  Wall motion     was normal; there were no

## 2018-07-06 NOTE — PROGRESS NOTES
[facility]     Gastroenterology Progress Note    Ramon Souza Patient Status:  Inpatient    1945 MRN [id number]   Location [facility] 4W/SW/SE Attending Ward Arora MD   Hosp Day # 3 PCP Homer Caban MD 7/5/2018  ECG Report  Interpretation  -------------------------- Sinus Bradycardia - occasional PAC . -Right bundle branch block.  ABNORMAL When compared with ECG of 07/03/2018 14:53:48 No significant changes have occurred Electronically signed on 07/06/201

## 2018-07-07 VITALS
DIASTOLIC BLOOD PRESSURE: 52 MMHG | BODY MASS INDEX: 30.92 KG/M2 | HEART RATE: 60 BPM | WEIGHT: 197 LBS | TEMPERATURE: 99 F | RESPIRATION RATE: 18 BRPM | HEIGHT: 67 IN | SYSTOLIC BLOOD PRESSURE: 117 MMHG | OXYGEN SATURATION: 98 %

## 2018-07-07 PROCEDURE — 99239 HOSP IP/OBS DSCHRG MGMT >30: CPT | Performed by: HOSPITALIST

## 2018-07-07 PROCEDURE — 99232 SBSQ HOSP IP/OBS MODERATE 35: CPT | Performed by: INTERNAL MEDICINE

## 2018-07-07 RX ORDER — ONDANSETRON HYDROCHLORIDE 8 MG/1
8 TABLET, FILM COATED ORAL EVERY 8 HOURS PRN
Qty: 20 TABLET | Refills: 0 | Status: SHIPPED | OUTPATIENT
Start: 2018-07-07 | End: 2018-07-17

## 2018-07-07 NOTE — PLAN OF CARE
GASTROINTESTINAL - ADULT    • Minimal or absence of nausea and vomiting Completed    • Maintains or returns to baseline bowel function Completed        METABOLIC/FLUID AND ELECTROLYTES - ADULT    • Electrolytes maintained within normal limits Completed

## 2018-07-07 NOTE — PROGRESS NOTES
[facility]     Gastroenterology Progress Note    Dee Andino Patient Status:  Inpatient    1945 MRN [id number]   Location [facility] 4W/SW/SE Attending Petey Orosco MD   Hosp Day # 4 PCP Krystal Helm MD as per surgery  -pain medications as needed  -follow up in 4 weeks    Meme Scanlon MD  [facility] - Gastroenterology  7/7/2018  3:53 PM

## 2018-07-07 NOTE — PROGRESS NOTES
[facility]    Progress Note    Patricia Cornea Mounika Patient Status:  Inpatient    1945 MRN [id number]   Location [facility] 4W/SW/SE Attending Rhys Magallanes MD   Hosp Day # 3 PCP Zaki Hussein MD       Subjective:   Patricia Cornea acetaminophen (TYLENOL) tab 650 mg, 650 mg, Oral, Q6H PRN  •  ondansetron HCl (ZOFRAN) injection 4 mg, 4 mg, Intravenous, Q6H PRN    Assessment and Plan:     Acute cholecystitis  With cholelithiasis, choledocholithiasis  Obstructive jaundice  Improved s/p

## 2018-07-07 NOTE — DISCHARGE SUMMARY
[facility]    Discharge Summary    [facility] Patient Status:  Inpatient    1945 MRN [id number]   Location [facility] 4W/SW/SE Attending Feliz Crowe MD   Hosp Day # 4 PCP Annie Soto MD     Date of Admissio vomiting, and chills, came in today to the emergency department for evaluation. AST,  and 124 respectively, alkaline phosphatase 607, total bilirubin 5.4, direct bilirubin 3.4, lipase 579.   Chemistry showed sodium 135, potassium 2.8, bicarb 18, BUN prescriptions at the location directed by your doctor or nurse    Bring a paper prescription for each of these medications  · HYDROcodone-acetaminophen 5-325 MG Tabs  · Ondansetron HCl 8 MG tablet       Follow-up With  Details  Why  Contact Info   Izzy Albert,

## 2018-07-07 NOTE — PROGRESS NOTES
[facility]    Progress Note    Dinh Rbiera Patient Status:  Inpatient    1945 MRN [id number]   Location [facility] 4W/SW/SE Attending Edwin Knutson MD   Hosp Day # 4 PCP Laine Hagan MD     Assessment and Plan:

## 2018-07-11 ENCOUNTER — TELEPHONE (OUTPATIENT)
Dept: SURGERY | Facility: CLINIC | Age: 73
End: 2018-07-11

## 2018-07-11 NOTE — TELEPHONE ENCOUNTER
Call transferred from [address] [place] [address]. Patient returned call. Relayed Dr. Erick Steiner message below. She states she is feeling better and declined medication at this time.  She verbalized understanding, will try antacids and will call back with any further questions/concerns

## 2018-07-11 NOTE — TELEPHONE ENCOUNTER
MD Nils Gaona RN Cc: Lucio Vizcarra LPN; Evon Chou RN   Caller: Unspecified (Today,  9:31 AM)             Zofran is pretty good medicine.  Nausea probably not from anesthesia at this point.  Maybe an antacid may help (tums or pepci

## 2018-07-11 NOTE — TELEPHONE ENCOUNTER
Surgery with dr Lisa Mares on 7-4-18. Pt has a question on rx. Zofran. Pt wants to explain to the rn.   Please call

## 2018-07-11 NOTE — TELEPHONE ENCOUNTER
Contacted patient. S/P Laparoscopic cholecystectomy on 07/05/2018. Patient states zofran prescribed by attending physician Dr. Livier Patel is causing diarrhea. She denies pain but is complaining of nausea.  She states she thinks the nausea is caused by anesthes

## 2018-07-11 NOTE — TELEPHONE ENCOUNTER
Dr. Mikel Mclaughlin, patient asked for a different anti-nausea medication. Please review advice below and advise if any further recommendations, thank you.

## 2018-07-17 ENCOUNTER — OFFICE VISIT (OUTPATIENT)
Dept: INTERNAL MEDICINE CLINIC | Facility: CLINIC | Age: 73
End: 2018-07-17

## 2018-07-17 VITALS
RESPIRATION RATE: 18 BRPM | DIASTOLIC BLOOD PRESSURE: 84 MMHG | TEMPERATURE: 98 F | OXYGEN SATURATION: 98 % | HEIGHT: 64.5 IN | HEART RATE: 88 BPM | WEIGHT: 193.5 LBS | BODY MASS INDEX: 32.63 KG/M2 | SYSTOLIC BLOOD PRESSURE: 158 MMHG

## 2018-07-17 DIAGNOSIS — M25.562 CHRONIC PAIN OF LEFT KNEE: Primary | ICD-10-CM

## 2018-07-17 DIAGNOSIS — Z90.49 STATUS POST LAPAROSCOPIC CHOLECYSTECTOMY: ICD-10-CM

## 2018-07-17 DIAGNOSIS — G89.29 CHRONIC PAIN OF LEFT KNEE: Primary | ICD-10-CM

## 2018-07-17 DIAGNOSIS — G89.29 CHRONIC PAIN OF RIGHT KNEE: ICD-10-CM

## 2018-07-17 DIAGNOSIS — M25.561 CHRONIC PAIN OF RIGHT KNEE: ICD-10-CM

## 2018-07-17 DIAGNOSIS — R03.0 ELEVATED BLOOD PRESSURE READING: ICD-10-CM

## 2018-07-17 PROBLEM — D69.6 THROMBOCYTOPENIA (HCC): Chronic | Status: ACTIVE | Noted: 2018-07-17

## 2018-07-17 PROBLEM — D69.6 THROMBOCYTOPENIA: Chronic | Status: ACTIVE | Noted: 2018-07-17

## 2018-07-17 PROCEDURE — 99204 OFFICE O/P NEW MOD 45 MIN: CPT | Performed by: INTERNAL MEDICINE

## 2018-07-17 PROCEDURE — 1111F DSCHRG MED/CURRENT MED MERGE: CPT | Performed by: INTERNAL MEDICINE

## 2018-07-17 NOTE — PROGRESS NOTES
HPI:    Patient ID: Becki Addison is a 68year old female. Pt here for a get established as a new pt. Had a lap cholecystectomy on 7/4/18  And preceded by ercp to clear CBD stones. Had not been to a dr for many years.   Has some residual from anaest (both knees). Psychiatric: She has a normal mood and affect.               ASSESSMENT/PLAN:   Chronic pain of left knee  (primary encounter diagnosis) chedck xray will need ortho referral  Chronic pain of right knee same  Sp laparoscopic cholecystectomy-

## 2018-07-23 ENCOUNTER — OFFICE VISIT (OUTPATIENT)
Dept: SURGERY | Facility: CLINIC | Age: 73
End: 2018-07-23

## 2018-07-23 DIAGNOSIS — K81.1 CHRONIC CHOLECYSTITIS: Primary | ICD-10-CM

## 2018-07-23 PROCEDURE — G0463 HOSPITAL OUTPT CLINIC VISIT: HCPCS | Performed by: SURGERY

## 2018-07-23 PROCEDURE — 99024 POSTOP FOLLOW-UP VISIT: CPT | Performed by: SURGERY

## 2018-07-23 NOTE — PROGRESS NOTES
Postoperative Patient Follow-up      7/23/2018    Nelly Durán Marychuytha 68year old      HPI  Patient presents with:  Post-Op: First post op. S/P Laparoscopic cholecystectomy on 07/05/2018.  Patient states she is experiencing some soreness, especially to left lo

## 2018-09-14 ENCOUNTER — OFFICE VISIT (OUTPATIENT)
Dept: INTERNAL MEDICINE CLINIC | Facility: CLINIC | Age: 73
End: 2018-09-14

## 2018-09-14 VITALS
WEIGHT: 190.63 LBS | DIASTOLIC BLOOD PRESSURE: 70 MMHG | TEMPERATURE: 98 F | HEART RATE: 68 BPM | HEIGHT: 70 IN | BODY MASS INDEX: 27.29 KG/M2 | OXYGEN SATURATION: 98 % | SYSTOLIC BLOOD PRESSURE: 142 MMHG

## 2018-09-14 DIAGNOSIS — D69.6 THROMBOCYTOPENIA (HCC): ICD-10-CM

## 2018-09-14 DIAGNOSIS — Z90.49 STATUS POST LAPAROSCOPIC CHOLECYSTECTOMY: Primary | ICD-10-CM

## 2018-09-14 LAB
BASOPHILS # BLD: 0 K/UL (ref 0–0.2)
BASOPHILS NFR BLD: 0 %
EOSINOPHIL # BLD: 0.2 K/UL (ref 0–0.7)
EOSINOPHIL NFR BLD: 4 %
ERYTHROCYTE [DISTWIDTH] IN BLOOD BY AUTOMATED COUNT: 15.8 % (ref 11–15)
HCT VFR BLD AUTO: 39.4 % (ref 35–48)
HGB BLD-MCNC: 12.7 G/DL (ref 12–16)
LYMPHOCYTES # BLD: 1.7 K/UL (ref 1–4)
LYMPHOCYTES NFR BLD: 32 %
MCH RBC QN AUTO: 29 PG (ref 27–32)
MCHC RBC AUTO-ENTMCNC: 32.2 G/DL (ref 32–37)
MCV RBC AUTO: 90 FL (ref 80–100)
MONOCYTES # BLD: 0.4 K/UL (ref 0–1)
MONOCYTES NFR BLD: 8 %
NEUTROPHILS # BLD AUTO: 2.9 K/UL (ref 1.8–7.7)
NEUTROPHILS NFR BLD: 56 %
PLATELET # BLD AUTO: 205 K/UL (ref 140–400)
PMV BLD AUTO: 9.4 FL (ref 7.4–10.3)
RBC # BLD AUTO: 4.38 M/UL (ref 3.7–5.4)
WBC # BLD AUTO: 5.2 K/UL (ref 4–11)

## 2018-09-14 PROCEDURE — 85025 COMPLETE CBC W/AUTO DIFF WBC: CPT | Performed by: INTERNAL MEDICINE

## 2018-09-14 PROCEDURE — 99213 OFFICE O/P EST LOW 20 MIN: CPT | Performed by: INTERNAL MEDICINE

## 2018-09-14 NOTE — PROGRESS NOTES
HPI:    Patient ID: Aysha Deleon is a 68year old female. 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 Finally is recovering both mentally and physically. Is currently on no medications.     Review of Systems   Constitution ASSESSMENT/PLAN:   Status post laparoscopic cholecystectomy  (primary encounter diagnosis) doing well finally  Thrombocytopenia (hcc)  Cbc recheck. No orders of the defined types were placed in this encounter.       Meds This Visit:  Requested Anita Miranda

## 2018-11-08 ENCOUNTER — TELEPHONE (OUTPATIENT)
Dept: INTERNAL MEDICINE CLINIC | Facility: CLINIC | Age: 73
End: 2018-11-08

## 2018-11-21 ENCOUNTER — TELEPHONE (OUTPATIENT)
Dept: INTERNAL MEDICINE CLINIC | Facility: CLINIC | Age: 73
End: 2018-11-21

## 2021-09-20 ENCOUNTER — CARDPULM VISIT (OUTPATIENT)
Dept: CARDIAC REHAB | Facility: HOSPITAL | Age: 76
End: 2021-09-20
Attending: INTERNAL MEDICINE
Payer: MEDICARE

## 2021-09-29 ENCOUNTER — CARDPULM VISIT (OUTPATIENT)
Dept: CARDIAC REHAB | Facility: HOSPITAL | Age: 76
End: 2021-09-29
Attending: INTERNAL MEDICINE
Payer: MEDICARE

## 2021-09-29 PROCEDURE — 93798 PHYS/QHP OP CAR RHAB W/ECG: CPT

## 2021-10-01 ENCOUNTER — CARDPULM VISIT (OUTPATIENT)
Dept: CARDIAC REHAB | Facility: HOSPITAL | Age: 76
End: 2021-10-01
Attending: INTERNAL MEDICINE
Payer: MEDICARE

## 2021-10-01 PROCEDURE — 93798 PHYS/QHP OP CAR RHAB W/ECG: CPT

## 2021-10-04 ENCOUNTER — CARDPULM VISIT (OUTPATIENT)
Dept: CARDIAC REHAB | Facility: HOSPITAL | Age: 76
End: 2021-10-04
Attending: INTERNAL MEDICINE
Payer: MEDICARE

## 2021-10-04 PROCEDURE — 93798 PHYS/QHP OP CAR RHAB W/ECG: CPT

## 2021-10-06 ENCOUNTER — CARDPULM VISIT (OUTPATIENT)
Dept: CARDIAC REHAB | Facility: HOSPITAL | Age: 76
End: 2021-10-06
Attending: INTERNAL MEDICINE
Payer: MEDICARE

## 2021-10-06 PROCEDURE — 93798 PHYS/QHP OP CAR RHAB W/ECG: CPT

## 2021-10-08 ENCOUNTER — CARDPULM VISIT (OUTPATIENT)
Dept: CARDIAC REHAB | Facility: HOSPITAL | Age: 76
End: 2021-10-08
Attending: INTERNAL MEDICINE
Payer: MEDICARE

## 2021-10-08 PROCEDURE — 93798 PHYS/QHP OP CAR RHAB W/ECG: CPT

## 2021-10-12 ENCOUNTER — CARDPULM VISIT (OUTPATIENT)
Dept: CARDIAC REHAB | Facility: HOSPITAL | Age: 76
End: 2021-10-12
Attending: INTERNAL MEDICINE
Payer: MEDICARE

## 2021-10-12 PROCEDURE — 93798 PHYS/QHP OP CAR RHAB W/ECG: CPT

## 2021-10-15 ENCOUNTER — CARDPULM VISIT (OUTPATIENT)
Dept: CARDIAC REHAB | Facility: HOSPITAL | Age: 76
End: 2021-10-15
Attending: INTERNAL MEDICINE
Payer: MEDICARE

## 2021-10-15 PROCEDURE — 93798 PHYS/QHP OP CAR RHAB W/ECG: CPT

## 2021-10-18 ENCOUNTER — CARDPULM VISIT (OUTPATIENT)
Dept: CARDIAC REHAB | Facility: HOSPITAL | Age: 76
End: 2021-10-18
Attending: INTERNAL MEDICINE
Payer: MEDICARE

## 2021-10-18 PROCEDURE — 93798 PHYS/QHP OP CAR RHAB W/ECG: CPT

## 2021-10-20 ENCOUNTER — CARDPULM VISIT (OUTPATIENT)
Dept: CARDIAC REHAB | Facility: HOSPITAL | Age: 76
End: 2021-10-20
Attending: INTERNAL MEDICINE
Payer: MEDICARE

## 2021-10-20 PROCEDURE — 93798 PHYS/QHP OP CAR RHAB W/ECG: CPT

## 2021-10-21 ENCOUNTER — ORDER TRANSCRIPTION (OUTPATIENT)
Dept: CARDIAC REHAB | Facility: HOSPITAL | Age: 76
End: 2021-10-21

## 2021-10-21 DIAGNOSIS — I25.118 CORONARY ARTERY DISEASE OF NATIVE ARTERY OF NATIVE HEART WITH STABLE ANGINA PECTORIS (HCC): Primary | ICD-10-CM

## 2021-10-22 ENCOUNTER — CARDPULM VISIT (OUTPATIENT)
Dept: CARDIAC REHAB | Facility: HOSPITAL | Age: 76
End: 2021-10-22
Attending: INTERNAL MEDICINE
Payer: MEDICARE

## 2021-10-22 PROCEDURE — 93798 PHYS/QHP OP CAR RHAB W/ECG: CPT

## 2021-10-27 ENCOUNTER — CARDPULM VISIT (OUTPATIENT)
Dept: CARDIAC REHAB | Facility: HOSPITAL | Age: 76
End: 2021-10-27
Attending: INTERNAL MEDICINE
Payer: MEDICARE

## 2021-10-27 PROCEDURE — 93798 PHYS/QHP OP CAR RHAB W/ECG: CPT

## 2021-10-29 ENCOUNTER — CARDPULM VISIT (OUTPATIENT)
Dept: CARDIAC REHAB | Facility: HOSPITAL | Age: 76
End: 2021-10-29
Attending: INTERNAL MEDICINE
Payer: MEDICARE

## 2021-10-29 PROCEDURE — 93798 PHYS/QHP OP CAR RHAB W/ECG: CPT

## 2021-11-03 ENCOUNTER — CARDPULM VISIT (OUTPATIENT)
Dept: CARDIAC REHAB | Facility: HOSPITAL | Age: 76
End: 2021-11-03
Attending: INTERNAL MEDICINE
Payer: MEDICARE

## 2021-11-03 PROCEDURE — 93798 PHYS/QHP OP CAR RHAB W/ECG: CPT

## 2021-11-05 ENCOUNTER — CARDPULM VISIT (OUTPATIENT)
Dept: CARDIAC REHAB | Facility: HOSPITAL | Age: 76
End: 2021-11-05
Attending: INTERNAL MEDICINE
Payer: MEDICARE

## 2021-11-05 PROCEDURE — 93798 PHYS/QHP OP CAR RHAB W/ECG: CPT

## 2021-11-08 ENCOUNTER — CARDPULM VISIT (OUTPATIENT)
Dept: CARDIAC REHAB | Facility: HOSPITAL | Age: 76
End: 2021-11-08
Attending: INTERNAL MEDICINE
Payer: MEDICARE

## 2021-11-08 PROCEDURE — 93798 PHYS/QHP OP CAR RHAB W/ECG: CPT

## 2021-11-10 ENCOUNTER — CARDPULM VISIT (OUTPATIENT)
Dept: CARDIAC REHAB | Facility: HOSPITAL | Age: 76
End: 2021-11-10
Attending: INTERNAL MEDICINE
Payer: MEDICARE

## 2021-11-10 PROCEDURE — 93798 PHYS/QHP OP CAR RHAB W/ECG: CPT

## 2021-11-12 ENCOUNTER — CARDPULM VISIT (OUTPATIENT)
Dept: CARDIAC REHAB | Facility: HOSPITAL | Age: 76
End: 2021-11-12
Attending: INTERNAL MEDICINE
Payer: MEDICARE

## 2021-11-12 PROCEDURE — 93798 PHYS/QHP OP CAR RHAB W/ECG: CPT

## 2021-11-15 ENCOUNTER — CARDPULM VISIT (OUTPATIENT)
Dept: CARDIAC REHAB | Facility: HOSPITAL | Age: 76
End: 2021-11-15
Attending: INTERNAL MEDICINE
Payer: MEDICARE

## 2021-11-15 PROCEDURE — 93798 PHYS/QHP OP CAR RHAB W/ECG: CPT

## 2021-11-17 ENCOUNTER — CARDPULM VISIT (OUTPATIENT)
Dept: CARDIAC REHAB | Facility: HOSPITAL | Age: 76
End: 2021-11-17
Attending: INTERNAL MEDICINE
Payer: MEDICARE

## 2021-11-17 PROCEDURE — 93798 PHYS/QHP OP CAR RHAB W/ECG: CPT

## 2021-11-19 ENCOUNTER — CARDPULM VISIT (OUTPATIENT)
Dept: CARDIAC REHAB | Facility: HOSPITAL | Age: 76
End: 2021-11-19
Attending: INTERNAL MEDICINE
Payer: MEDICARE

## 2021-11-19 PROCEDURE — 93798 PHYS/QHP OP CAR RHAB W/ECG: CPT

## 2021-11-22 ENCOUNTER — CARDPULM VISIT (OUTPATIENT)
Dept: CARDIAC REHAB | Facility: HOSPITAL | Age: 76
End: 2021-11-22
Attending: INTERNAL MEDICINE
Payer: MEDICARE

## 2021-11-22 PROCEDURE — 93798 PHYS/QHP OP CAR RHAB W/ECG: CPT

## 2021-11-24 ENCOUNTER — CARDPULM VISIT (OUTPATIENT)
Dept: CARDIAC REHAB | Facility: HOSPITAL | Age: 76
End: 2021-11-24
Attending: INTERNAL MEDICINE
Payer: MEDICARE

## 2021-11-24 PROCEDURE — 93798 PHYS/QHP OP CAR RHAB W/ECG: CPT

## 2021-11-26 ENCOUNTER — CARDPULM VISIT (OUTPATIENT)
Dept: CARDIAC REHAB | Facility: HOSPITAL | Age: 76
End: 2021-11-26
Attending: INTERNAL MEDICINE
Payer: MEDICARE

## 2021-11-26 PROCEDURE — 93798 PHYS/QHP OP CAR RHAB W/ECG: CPT

## 2021-11-29 ENCOUNTER — CARDPULM VISIT (OUTPATIENT)
Dept: CARDIAC REHAB | Facility: HOSPITAL | Age: 76
End: 2021-11-29
Attending: INTERNAL MEDICINE
Payer: MEDICARE

## 2021-11-29 PROCEDURE — 93798 PHYS/QHP OP CAR RHAB W/ECG: CPT

## 2021-12-01 ENCOUNTER — CARDPULM VISIT (OUTPATIENT)
Dept: CARDIAC REHAB | Facility: HOSPITAL | Age: 76
End: 2021-12-01
Attending: INTERNAL MEDICINE
Payer: MEDICARE

## 2021-12-01 PROCEDURE — 93798 PHYS/QHP OP CAR RHAB W/ECG: CPT

## 2021-12-03 ENCOUNTER — CARDPULM VISIT (OUTPATIENT)
Dept: CARDIAC REHAB | Facility: HOSPITAL | Age: 76
End: 2021-12-03
Attending: INTERNAL MEDICINE
Payer: MEDICARE

## 2021-12-03 PROCEDURE — 93798 PHYS/QHP OP CAR RHAB W/ECG: CPT

## 2021-12-06 ENCOUNTER — CARDPULM VISIT (OUTPATIENT)
Dept: CARDIAC REHAB | Facility: HOSPITAL | Age: 76
End: 2021-12-06
Attending: INTERNAL MEDICINE
Payer: MEDICARE

## 2021-12-06 PROCEDURE — 93798 PHYS/QHP OP CAR RHAB W/ECG: CPT

## 2021-12-08 ENCOUNTER — CARDPULM VISIT (OUTPATIENT)
Dept: CARDIAC REHAB | Facility: HOSPITAL | Age: 76
End: 2021-12-08
Attending: INTERNAL MEDICINE
Payer: MEDICARE

## 2021-12-08 PROCEDURE — 93798 PHYS/QHP OP CAR RHAB W/ECG: CPT

## 2021-12-10 ENCOUNTER — CARDPULM VISIT (OUTPATIENT)
Dept: CARDIAC REHAB | Facility: HOSPITAL | Age: 76
End: 2021-12-10
Attending: INTERNAL MEDICINE
Payer: MEDICARE

## 2021-12-10 PROCEDURE — 93798 PHYS/QHP OP CAR RHAB W/ECG: CPT

## 2021-12-13 ENCOUNTER — CARDPULM VISIT (OUTPATIENT)
Dept: CARDIAC REHAB | Facility: HOSPITAL | Age: 76
End: 2021-12-13
Attending: INTERNAL MEDICINE
Payer: MEDICARE

## 2021-12-13 PROCEDURE — 93798 PHYS/QHP OP CAR RHAB W/ECG: CPT

## 2021-12-15 ENCOUNTER — CARDPULM VISIT (OUTPATIENT)
Dept: CARDIAC REHAB | Facility: HOSPITAL | Age: 76
End: 2021-12-15
Attending: INTERNAL MEDICINE
Payer: MEDICARE

## 2021-12-15 PROCEDURE — 93798 PHYS/QHP OP CAR RHAB W/ECG: CPT

## 2021-12-17 ENCOUNTER — CARDPULM VISIT (OUTPATIENT)
Dept: CARDIAC REHAB | Facility: HOSPITAL | Age: 76
End: 2021-12-17
Attending: INTERNAL MEDICINE
Payer: MEDICARE

## 2021-12-17 PROCEDURE — 93798 PHYS/QHP OP CAR RHAB W/ECG: CPT

## 2021-12-20 ENCOUNTER — CARDPULM VISIT (OUTPATIENT)
Dept: CARDIAC REHAB | Facility: HOSPITAL | Age: 76
End: 2021-12-20
Attending: INTERNAL MEDICINE
Payer: MEDICARE

## 2021-12-20 PROCEDURE — 93798 PHYS/QHP OP CAR RHAB W/ECG: CPT

## 2021-12-22 ENCOUNTER — CARDPULM VISIT (OUTPATIENT)
Dept: CARDIAC REHAB | Facility: HOSPITAL | Age: 76
End: 2021-12-22
Attending: INTERNAL MEDICINE
Payer: MEDICARE

## 2021-12-22 PROCEDURE — 93798 PHYS/QHP OP CAR RHAB W/ECG: CPT

## 2021-12-27 ENCOUNTER — CARDPULM VISIT (OUTPATIENT)
Dept: CARDIAC REHAB | Facility: HOSPITAL | Age: 76
End: 2021-12-27
Attending: INTERNAL MEDICINE
Payer: MEDICARE

## 2021-12-27 PROCEDURE — 93798 PHYS/QHP OP CAR RHAB W/ECG: CPT

## 2021-12-29 ENCOUNTER — APPOINTMENT (OUTPATIENT)
Dept: CARDIAC REHAB | Facility: HOSPITAL | Age: 76
End: 2021-12-29
Attending: INTERNAL MEDICINE
Payer: MEDICARE

## 2023-05-04 ENCOUNTER — ORDER TRANSCRIPTION (OUTPATIENT)
Dept: PHYSICAL THERAPY | Facility: HOSPITAL | Age: 78
End: 2023-05-04

## 2023-05-04 DIAGNOSIS — Z96.651 PRESENCE OF RIGHT ARTIFICIAL KNEE JOINT: Primary | ICD-10-CM

## 2023-06-08 ENCOUNTER — LAB REQUISITION (OUTPATIENT)
Dept: LAB | Facility: HOSPITAL | Age: 78
End: 2023-06-08
Payer: MEDICARE

## 2023-06-08 DIAGNOSIS — I10 ESSENTIAL (PRIMARY) HYPERTENSION: ICD-10-CM

## 2023-06-08 LAB
ANION GAP SERPL CALC-SCNC: 7 MMOL/L (ref 0–18)
BASOPHILS # BLD AUTO: 0.02 X10(3) UL (ref 0–0.2)
BASOPHILS NFR BLD AUTO: 0.3 %
BUN BLD-MCNC: 17 MG/DL (ref 7–18)
BUN/CREAT SERPL: 14.2 (ref 10–20)
CALCIUM BLD-MCNC: 9.6 MG/DL (ref 8.5–10.1)
CHLORIDE SERPL-SCNC: 109 MMOL/L (ref 98–112)
CO2 SERPL-SCNC: 27 MMOL/L (ref 21–32)
CREAT BLD-MCNC: 1.2 MG/DL
DEPRECATED RDW RBC AUTO: 49.6 FL (ref 35.1–46.3)
EOSINOPHIL # BLD AUTO: 0.24 X10(3) UL (ref 0–0.7)
EOSINOPHIL NFR BLD AUTO: 3.9 %
ERYTHROCYTE [DISTWIDTH] IN BLOOD BY AUTOMATED COUNT: 14.2 % (ref 11–15)
GFR SERPLBLD BASED ON 1.73 SQ M-ARVRAT: 46 ML/MIN/1.73M2 (ref 60–?)
GLUCOSE BLD-MCNC: 112 MG/DL (ref 70–99)
HCT VFR BLD AUTO: 34 %
HGB BLD-MCNC: 10.8 G/DL
IMM GRANULOCYTES # BLD AUTO: 0.01 X10(3) UL (ref 0–1)
IMM GRANULOCYTES NFR BLD: 0.2 %
LYMPHOCYTES # BLD AUTO: 1.67 X10(3) UL (ref 1–4)
LYMPHOCYTES NFR BLD AUTO: 27.3 %
MCH RBC QN AUTO: 30.3 PG (ref 26–34)
MCHC RBC AUTO-ENTMCNC: 31.8 G/DL (ref 31–37)
MCV RBC AUTO: 95.2 FL
MONOCYTES # BLD AUTO: 0.39 X10(3) UL (ref 0.1–1)
MONOCYTES NFR BLD AUTO: 6.4 %
NEUTROPHILS # BLD AUTO: 3.78 X10 (3) UL (ref 1.5–7.7)
NEUTROPHILS # BLD AUTO: 3.78 X10(3) UL (ref 1.5–7.7)
NEUTROPHILS NFR BLD AUTO: 61.9 %
OSMOLALITY SERPL CALC.SUM OF ELEC: 298 MOSM/KG (ref 275–295)
PLATELET # BLD AUTO: 246 10(3)UL (ref 150–450)
POTASSIUM SERPL-SCNC: 4.4 MMOL/L (ref 3.5–5.1)
RBC # BLD AUTO: 3.57 X10(6)UL
SODIUM SERPL-SCNC: 143 MMOL/L (ref 136–145)
WBC # BLD AUTO: 6.1 X10(3) UL (ref 4–11)

## 2023-06-08 PROCEDURE — 85025 COMPLETE CBC W/AUTO DIFF WBC: CPT | Performed by: ORTHOPAEDIC SURGERY

## 2023-06-08 PROCEDURE — 80048 BASIC METABOLIC PNL TOTAL CA: CPT | Performed by: ORTHOPAEDIC SURGERY

## 2023-08-21 ENCOUNTER — TELEPHONE (OUTPATIENT)
Dept: PHYSICAL THERAPY | Facility: HOSPITAL | Age: 78
End: 2023-08-21

## 2023-08-21 ENCOUNTER — ORDER TRANSCRIPTION (OUTPATIENT)
Dept: PHYSICAL THERAPY | Facility: HOSPITAL | Age: 78
End: 2023-08-21

## 2023-08-21 DIAGNOSIS — Z96.652 AFTERCARE FOLLOWING LEFT KNEE JOINT REPLACEMENT SURGERY: ICD-10-CM

## 2023-08-21 DIAGNOSIS — Z47.1 AFTERCARE FOLLOWING LEFT KNEE JOINT REPLACEMENT SURGERY: ICD-10-CM

## 2023-08-21 DIAGNOSIS — Z96.651 S/P TOTAL KNEE ARTHROPLASTY, RIGHT: Primary | ICD-10-CM

## 2023-08-25 ENCOUNTER — TELEPHONE (OUTPATIENT)
Dept: PHYSICAL THERAPY | Facility: HOSPITAL | Age: 78
End: 2023-08-25

## 2023-08-30 ENCOUNTER — OFFICE VISIT (OUTPATIENT)
Dept: PHYSICAL THERAPY | Facility: HOSPITAL | Age: 78
End: 2023-08-30
Attending: ORTHOPAEDIC SURGERY
Payer: MEDICARE

## 2023-08-30 DIAGNOSIS — M25.562 LEFT KNEE PAIN: Primary | ICD-10-CM

## 2023-08-30 DIAGNOSIS — Z47.1 AFTERCARE FOLLOWING LEFT KNEE JOINT REPLACEMENT SURGERY: ICD-10-CM

## 2023-08-30 DIAGNOSIS — M62.81 GENERALIZED MUSCLE WEAKNESS: ICD-10-CM

## 2023-08-30 DIAGNOSIS — Z96.652 AFTERCARE FOLLOWING LEFT KNEE JOINT REPLACEMENT SURGERY: ICD-10-CM

## 2023-08-30 DIAGNOSIS — Z96.651 S/P TOTAL KNEE ARTHROPLASTY, RIGHT: ICD-10-CM

## 2023-08-30 DIAGNOSIS — M25.562 LEFT KNEE PAIN, UNSPECIFIED CHRONICITY: ICD-10-CM

## 2023-08-30 DIAGNOSIS — Z74.09 DECREASED FUNCTIONAL MOBILITY AND ENDURANCE: ICD-10-CM

## 2023-08-30 PROCEDURE — 97162 PT EVAL MOD COMPLEX 30 MIN: CPT

## 2023-08-30 PROCEDURE — 97110 THERAPEUTIC EXERCISES: CPT

## 2023-09-01 ENCOUNTER — OFFICE VISIT (OUTPATIENT)
Dept: PHYSICAL THERAPY | Facility: HOSPITAL | Age: 78
End: 2023-09-01
Attending: ORTHOPAEDIC SURGERY
Payer: MEDICARE

## 2023-09-01 PROCEDURE — 97140 MANUAL THERAPY 1/> REGIONS: CPT

## 2023-09-01 PROCEDURE — 97530 THERAPEUTIC ACTIVITIES: CPT

## 2023-09-01 PROCEDURE — 97110 THERAPEUTIC EXERCISES: CPT

## 2023-09-06 ENCOUNTER — APPOINTMENT (OUTPATIENT)
Dept: PHYSICAL THERAPY | Facility: HOSPITAL | Age: 78
End: 2023-09-06
Attending: ORTHOPAEDIC SURGERY
Payer: MEDICARE

## 2023-09-07 NOTE — PROGRESS NOTES
Diagnosis:     S/P total knee arthroplasty, right   Aftercare following left knee joint replacement surgery  Referring Provider: Darlin  Date of Evaluation:    8/30/2023    Precautions:  Fall Risk, Bernice Arreola Next MD visit:   9/27/23     Date of Surgery: 7/25/23     Insurance Primary/Secondary: [facility] / N/A     # Auth Visits: 10            HPI: La Hull is a 66year old female who presents to therapy today s/p right TKA on 4/25/23 and left TKA on 7/25/23 with complaints tightness in the left quad and calf, and decreased activity tolerance. She has been completing her home PT exercises daily, which alleviates symptoms, along with completing self massage. She noted a mild loss of feeling near the lateral joint line on right knee, though it is improving. Pt wanted to get both knees done in small amount of time. Completed therapy on right knee toward middle of June. Pt describes pain level current 1/10, at best 1/10, at worst 1/10. Current functional limitations include walking and stairs, and prolonged standing and walking activities such as household chores. Subjective: Pt felt okay after last session, but when getting home she felt like she was unable to get our of the car. The pulling in the back of her leg. Pain was so bad she was nauseous and had 101 fever and this continued to Saturday. Saw APN, Got Xrays, everything looks great. Today is feeling better, was able to complete more ADLs with some seated rest breaks. Taking Celebrex for 2 weeks for inflammation, hot packs, and pain meds PRN  Pt continues to report pulling around her left knee, but has been completing her HEP every day. Pain: 6/10 beginning of session, with increased knee flexion 7/10      Objective: Improved patellar mobility L knee all directions. L knee flexion ROM: 90 Pt requested     Assessment:  Today's session focused more on L knee mobility and STM, and reassessment of her ROM.  Pt cancelled her last session because the pain in the back of her leg become so intense she felt nauseous and had a fever of 101 that continued into Saturday. She saw APN, xrays were taken which were normal, and she has a suspected HS strain which is consistent with the description of the location of her pain/pulling. Worked on active assisted knee flexion, and emphasized the importance of regaining knee flexion ROM to a functional range. Goals:   Goals: (To be met in 12 visits)   Pt will improve knee AROM flexion to >120 degrees to improve ability to perform stair negotiation. Pt will improve quad strength to 5/5 to ascend 1 flight of stairs reciprocally without UE assist  Pt will increase hip and knee strength to grossly 5/5 to be able to get up and down from the floor safely  Pt will demonstrate increased hip ER/ABD strength to 4+/5 to perform stepping and squatting activities without excessive femoral IR/ADD  Pt will improve SLS to >6s to improve safety and independence with gait on uneven surfaces such as grass  Pt will be independent and compliant with comprehensive HEP to maintain progress achieved in PT    Plan: Continue to increase L knee flexion ROM, quad activation, and gait mechanics.     Date: 9/1/2023  TX#: 2/10 Date: 9/8/2023    TX#: 3/10 Date:     TX#: 4/10 Date:     TX#: 5/10 Date:    TX#: 6/10   HEP         Provided at [facility]       Manual Therapy         STM  Gastroc/soleus  HS  Quad  Mob  G2-3 patellar all directions    10' STM: Gastroc/soleus, HS, Quad  Mob: G2-3 patellar all directions    20'      Therapeutic Exercise         PROM knee   Heel slides x10,5with overpressure from PT  DKTC with SB x3'  SAQ x10 R, 2x10 L  Standing TKE   Standing heel raises with UE support    23' -Reassessment   Heel slides 15x5\"   - Seated HS stretch  - Standing HS stretch on step  -Knee flexion on step  -Pt edu: importance of functional knee flex ROM, anatomy of quad/HS, and plan for progression     x25'      Therapeutic Activity         Walking: lap around clinic with 2ww, emphasis on L knee ROM   Walking fwd/bwd in parallel bars- emphasis  on knee ROM   Side steps at // bars- cues to increase knee flexion     8' -Replicate getting in and out of car   x5'      Neuromuscular Re-ed                  Charges: 1 manual, 2 TE    Total Timed Treatment: 50 min  Total Treatment Time: 50 min

## 2023-09-08 ENCOUNTER — OFFICE VISIT (OUTPATIENT)
Dept: PHYSICAL THERAPY | Facility: HOSPITAL | Age: 78
End: 2023-09-08
Attending: ORTHOPAEDIC SURGERY
Payer: MEDICARE

## 2023-09-08 PROCEDURE — 97110 THERAPEUTIC EXERCISES: CPT

## 2023-09-08 PROCEDURE — 97140 MANUAL THERAPY 1/> REGIONS: CPT

## 2023-09-08 NOTE — PROGRESS NOTES
Diagnosis:     S/P total knee arthroplasty, right   Aftercare following left knee joint replacement surgery  Referring Provider: Darlin  Date of Evaluation:    8/30/2023    Precautions:  Fall Risk, Robert Alford Next MD visit:   9/27/23     Date of Surgery: 7/25/23     Insurance Primary/Secondary: [facility] / N/A     # Auth Visits: 10            HPI: Arlene Lott is a 66year old female who presents to therapy today s/p right TKA on 4/25/23 and left TKA on 7/25/23 with complaints tightness in the left quad and calf, and decreased activity tolerance. She has been completing her home PT exercises daily, which alleviates symptoms, along with completing self massage. She noted a mild loss of feeling near the lateral joint line on right knee, though it is improving. Pt wanted to get both knees done in small amount of time. Completed therapy on right knee toward middle of June. Pt describes pain level current 1/10, at best 1/10, at worst 1/10. Current functional limitations include walking and stairs, and prolonged standing and walking activities such as household chores. Subjective: Pt feels much better this week. She has been able to perform her HEP daily without issues. Pain: 0/10       Objective: Improved patellar mobility L knee all directions. L knee flexion ROM: 104     Assessment:  Pt's L knee flexion ROM improved from 90 deg last week to 104 deg today. Her left quad activation is improving, though lacks endurance. Attempted elevated sit to stands, which pt was unable to complete without UE support- though pt appeared more apprehensive about performing without UE support than having a lack of strength to complete. Will continue to progress as able. Goals:   Goals: (To be met in 12 visits)   Pt will improve knee AROM flexion to >120 degrees to improve ability to perform stair negotiation.   Pt will improve quad strength to 5/5 to ascend 1 flight of stairs reciprocally without UE assist  Pt will increase hip and knee strength to grossly 5/5 to be able to get up and down from the floor safely  Pt will demonstrate increased hip ER/ABD strength to 4+/5 to perform stepping and squatting activities without excessive femoral IR/ADD  Pt will improve SLS to >6s to improve safety and independence with gait on uneven surfaces such as grass  Pt will be independent and compliant with comprehensive HEP to maintain progress achieved in PT    Plan: Continue to increase L knee flexion ROM, quad activation, and gait mechanics.  Bridging, sit to stand, mini squat, standing heel raises, slandboard stretch next session    Date: 9/1/2023  TX#: 2/10 Date: 9/8/2023    TX#: 3/10 Date: 9/11/2023    TX#: 4/10 Date:     TX#: 5/10 Date:    TX#: 6/10   HEP         Provided at [facility]       Manual Therapy         STM  Gastroc/soleus  HS  Quad  Mob  G2-3 patellar all directions    10' STM: Gastroc/soleus, HS, Quad  Mob: G2-3 patellar all directions    20' STM: Gastroc/soleus, HS, Quad  Mob: G2-3 patellar all directions, G3 AP tibiofemoral jt    15'     Therapeutic Exercise         PROM knee   Heel slides x10,5with overpressure from PT  DKTC with SB x3'  SAQ x10 R, 2x10 L  Standing TKE   Standing heel raises with UE support    23' -Reassessment   Heel slides 15x5\"   - Seated HS stretch  - Standing HS stretch on step  -Knee flexion on step  -Pt edu: importance of functional knee flex ROM, anatomy of quad/HS, and plan for progression     x25' Nustep level 3 5'  - Knee flexion on step   - Seated knee flexion with strap x5  - HS sets 2x10 5\" hold  - SAQ 2x10   - Sit to stands elevated able intermittent use of UE    30'       Therapeutic Activity         Walking: lap around clinic with 2ww, emphasis on L knee ROM   Walking fwd/bwd in parallel bars- emphasis  on knee ROM   Side steps at // bars- cues to increase knee flexion     8' -Replicate getting in and out of car   x5'      Neuromuscular Re-ed                  Charges: 1 manual, 2 TE    Total Timed Treatment: 40 min  Total Treatment Time: 45 min

## 2023-09-11 ENCOUNTER — OFFICE VISIT (OUTPATIENT)
Dept: PHYSICAL THERAPY | Facility: HOSPITAL | Age: 78
End: 2023-09-11
Attending: ORTHOPAEDIC SURGERY
Payer: MEDICARE

## 2023-09-11 PROCEDURE — 97110 THERAPEUTIC EXERCISES: CPT

## 2023-09-11 PROCEDURE — 97140 MANUAL THERAPY 1/> REGIONS: CPT

## 2023-09-13 ENCOUNTER — OFFICE VISIT (OUTPATIENT)
Dept: PHYSICAL THERAPY | Facility: HOSPITAL | Age: 78
End: 2023-09-13
Attending: ORTHOPAEDIC SURGERY
Payer: MEDICARE

## 2023-09-13 PROCEDURE — 97140 MANUAL THERAPY 1/> REGIONS: CPT

## 2023-09-13 PROCEDURE — 97112 NEUROMUSCULAR REEDUCATION: CPT

## 2023-09-13 PROCEDURE — 97110 THERAPEUTIC EXERCISES: CPT

## 2023-09-15 ENCOUNTER — APPOINTMENT (OUTPATIENT)
Dept: PHYSICAL THERAPY | Facility: HOSPITAL | Age: 78
End: 2023-09-15
Attending: ORTHOPAEDIC SURGERY
Payer: MEDICARE

## 2023-09-19 NOTE — PROGRESS NOTES
Diagnosis:     S/P total knee arthroplasty, right   Aftercare following left knee joint replacement surgery  Referring Provider: Darlin  Date of Evaluation:    8/30/2023    Precautions:  Fall Risk, VARKAUS Next MD visit:   9/27/23     Date of Surgery: 7/25/23     Insurance Primary/Secondary: [facility] / N/A     # Auth Visits: 10            HPI: Parmjit Matos is a 66year old female who presents to therapy today s/p right TKA on 4/25/23 and left TKA on 7/25/23 with complaints tightness in the left quad and calf, and decreased activity tolerance. She has been completing her home PT exercises daily, which alleviates symptoms, along with completing self massage. She noted a mild loss of feeling near the lateral joint line on right knee, though it is improving. Pt wanted to get both knees done in small amount of time. Completed therapy on right knee toward middle of June. Pt describes pain level current 1/10, at best 1/10, at worst 1/10. Current functional limitations include walking and stairs, and prolonged standing and walking activities such as household chores. Subjective: Pt feels okay, only complaint is a pulling along her left quad but once she starts exercising it goes away. Pain: 0/10       Objective: L knee flexion ROM: 116 deg    Assessment:  Pt feels she is about 50% improved. She's been trying to participate in ADLs more often, though feels her stamina is still poor. Her right knee flexion AROM has improved tremendously, and her walking mechanics are more normalized. Will continue to target functional LE strength and walking mechanics. Goals:   Goals: (To be met in 12 visits)   Pt will improve knee AROM flexion to >120 degrees to improve ability to perform stair negotiation.   Pt will improve quad strength to 5/5 to ascend 1 flight of stairs reciprocally without UE assist  Pt will increase hip and knee strength to grossly 5/5 to be able to get up and down from the floor safely  Pt will demonstrate increased hip ER/ABD strength to 4+/5 to perform stepping and squatting activities without excessive femoral IR/ADD  Pt will improve SLS to >6s to improve safety and independence with gait on uneven surfaces such as grass  Pt will be independent and compliant with comprehensive HEP to maintain progress achieved in PT    Plan: Continue to increase L knee flexion ROM, quad activation, and gait mechanics.  Bridging, sit to stand, mini squat, standing heel raises, slandboard stretch next session    Date: 9/1/2023  TX#: 2/10 Date: 9/8/2023    TX#: 3/10 Date: 9/11/2023    TX#: 4/10 Date: 9/13/2023    TX#: 5/10 Date: 9/20/2023    TX#: 6/10   HEP         Provided at [facility]   Standing hip flex stretch     Manual Therapy         STM  Gastroc/soleus  HS  Quad  Mob  G2-3 patellar all directions    10' STM: Gastroc/soleus, HS, Quad  Mob: G2-3 patellar all directions    20' STM: Gastroc/soleus, HS, Quad  Mob: G2-3 patellar all directions, G3 AP tibiofemoral jt    15' STM: R quad with modified damion test stretch     8'    Therapeutic Exercise         PROM knee   Heel slides x10,5with overpressure from PT  DKTC with SB x3'  SAQ x10 R, 2x10 L  Standing TKE   Standing heel raises with UE support    23' -Reassessment   Heel slides 15x5\"   - Seated HS stretch  - Standing HS stretch on step  -Knee flexion on step  -Pt edu: importance of functional knee flex ROM, anatomy of quad/HS, and plan for progression     x25' Nustep level 3 5'  - Knee flexion on step   - Seated knee flexion with strap x5  - HS sets 2x10 5\" hold  - SAQ 2x10   - Sit to stands elevated able intermittent use of UE    30' (25')   Nustep level 4 5' focus on active knee flex and ext   - Standing Hip flexor stretch  - Sit to stands elevated table x5  -Standing TKE BlueTB 2x10 3\" hold  -Standing heel raises 3x10  24'     Nustep level 4 10' cardio warm up (5' unsupervised)   - Standing Hip flexor stretch  - Sit to stands elevated table 2x5  -Standing heel raises 3x10  -Side steps at / x3  -Supine heel slides   -LTR x20  -Supine marching focus on TA contraction x20    30' (25')   Therapeutic Activity         Walking: lap around clinic with 2ww, emphasis on L knee ROM   Walking fwd/bwd in parallel bars- emphasis  on knee ROM   Side steps at // bars- cues to increase knee flexion     8' -Replicate getting in and out of car   x5'      Neuromuscular Re-ed            -Gait mechanics: increased knee flexion during swing and heel-toe contact   -Pt edu: improved gait mechanics   8' Gait mechanics: increased knee flexion during swing and heel-toe contact   -Pt edu: improved gait mechanics  Sit to stand x10 with quad set    15'     Charges: 2 TE, 1 NR    Total Timed Treatment: 40 min  Total Treatment Time: 45 min

## 2023-09-20 ENCOUNTER — OFFICE VISIT (OUTPATIENT)
Dept: PHYSICAL THERAPY | Facility: HOSPITAL | Age: 78
End: 2023-09-20
Attending: ORTHOPAEDIC SURGERY
Payer: MEDICARE

## 2023-09-20 PROCEDURE — 97110 THERAPEUTIC EXERCISES: CPT

## 2023-09-20 PROCEDURE — 97112 NEUROMUSCULAR REEDUCATION: CPT

## 2023-09-21 NOTE — PROGRESS NOTES
Diagnosis:     S/P total knee arthroplasty, right   Aftercare following left knee joint replacement surgery  Referring Provider: Darlin  Date of Evaluation:    8/30/2023    Precautions:  Fall Risk, Debi Bond MD visit:   9/27/23     Date of Surgery: 7/25/23     Insurance Primary/Secondary: [facility] / N/A     # Auth Visits: 10            HPI: Ting Chapin is a 66year old female who presents to therapy today s/p right TKA on 4/25/23 and left TKA on 7/25/23 with complaints tightness in the left quad and calf, and decreased activity tolerance. She has been completing her home PT exercises daily, which alleviates symptoms, along with completing self massage. She noted a mild loss of feeling near the lateral joint line on right knee, though it is improving. Pt wanted to get both knees done in small amount of time. Completed therapy on right knee toward middle of June. Pt describes pain level current 1/10, at best 1/10, at worst 1/10. Current functional limitations include walking and stairs, and prolonged standing and walking activities such as household chores. Subjective: Pulling in the quad feels the same, severity comes and goes. Felt more pulling in the groin then moves along the thigh but improves with activity. Pain: 0/10       Objective: L knee flexion ROM: 116 deg    Assessment:  Pt continues to report pulling in her left quad, though it improves with activity. Worked on stair negotiation today, she has difficulty controlled the descent, therefore utilized 2\" then progressed to 4\". Will continue to target functional LE strength and walking mechanics. Goals:   Goals: (To be met in 12 visits)   Pt will improve knee AROM flexion to >120 degrees to improve ability to perform stair negotiation.   Pt will improve quad strength to 5/5 to ascend 1 flight of stairs reciprocally without UE assist  Pt will increase hip and knee strength to grossly 5/5 to be able to get up and down from the floor safely  Pt will demonstrate increased hip ER/ABD strength to 4+/5 to perform stepping and squatting activities without excessive femoral IR/ADD  Pt will improve SLS to >6s to improve safety and independence with gait on uneven surfaces such as grass  Pt will be independent and compliant with comprehensive HEP to maintain progress achieved in PT    Plan: Continue to increase L knee flexion ROM, quad activation, and gait mechanics.  Bridging, sit to stand, mini squat, standing heel raises, slandboard stretch next session    Date: 9/1/2023  TX#: 2/10 Date: 9/8/2023    TX#: 3/10 Date: 9/11/2023    TX#: 4/10 Date: 9/13/2023    TX#: 5/10 Date: 9/20/2023    TX#: 6/10 Date: 9/22/23    Tx#: 7/10   HEP         Provided at [facility]   Standing hip flex stretch      Manual Therapy         STM  Gastroc/soleus  HS  Quad  Mob  G2-3 patellar all directions    10' STM: Gastroc/soleus, HS, Quad  Mob: G2-3 patellar all directions    20' STM: Gastroc/soleus, HS, Quad  Mob: G2-3 patellar all directions, G3 AP tibiofemoral jt    15' STM: R quad with modified damion test stretch     8'     Therapeutic Exercise         PROM knee   Heel slides x10,5with overpressure from PT  DKTC with SB x3'  SAQ x10 R, 2x10 L  Standing TKE   Standing heel raises with UE support    23' -Reassessment   Heel slides 15x5\"   - Seated HS stretch  - Standing HS stretch on step  -Knee flexion on step  -Pt edu: importance of functional knee flex ROM, anatomy of quad/HS, and plan for progression     x25' Nustep level 3 5'  - Knee flexion on step   - Seated knee flexion with strap x5  - HS sets 2x10 5\" hold  - SAQ 2x10   - Sit to stands elevated able intermittent use of UE    30' (25')   Nustep level 4 5' focus on active knee flex and ext   - Standing Hip flexor stretch  - Sit to stands elevated table x5  -Standing TKE BlueTB 2x10 3\" hold  -Standing heel raises 3x10  24'     Nustep level 4 10' cardio warm up (5' unsupervised)   - Standing Hip flexor stretch  - Sit to stands elevated table 2x5  -Standing heel raises 3x10  -Side steps at / x3  -Supine heel slides   -LTR x20  -Supine marching focus on TA contraction x20    30' (25') Nustep level 4 10 min  -Step up 4\"  -Step down 2-4\"      25'    Therapeutic Activity         Walking: lap around clinic with 2ww, emphasis on L knee ROM   Walking fwd/bwd in parallel bars- emphasis  on knee ROM   Side steps at // bars- cues to increase knee flexion     8' -Replicate getting in and out of car   x5'       Neuromuscular Re-ed            -Gait mechanics: increased knee flexion during swing and heel-toe contact   -Pt edu: improved gait mechanics   8' Gait mechanics: increased knee flexion during swing and heel-toe contact   -Pt edu: improved gait mechanics  Sit to stand x10 with quad set    15' 15'    -Sit to stand x5 with quad set    -Paloff press BTB 2x10 B- too painful on back   -Seated marching with TA contaction   -Standing hip arcs with slider x5B  -Standing hip ext with slider x5 B      Charges: 2 TE, 1 NR    Total Timed Treatment: 40 min  Total Treatment Time: 40 min

## 2023-09-22 ENCOUNTER — OFFICE VISIT (OUTPATIENT)
Dept: PHYSICAL THERAPY | Facility: HOSPITAL | Age: 78
End: 2023-09-22
Attending: ORTHOPAEDIC SURGERY
Payer: MEDICARE

## 2023-09-22 PROCEDURE — 97112 NEUROMUSCULAR REEDUCATION: CPT

## 2023-09-22 PROCEDURE — 97110 THERAPEUTIC EXERCISES: CPT

## 2023-09-25 NOTE — PROGRESS NOTES
Diagnosis:     S/P total knee arthroplasty, right   Aftercare following left knee joint replacement surgery  Referring Provider: Darlin  Date of Evaluation:    8/30/2023    Precautions:  Fall Risk, Irsrael Bond MD visit:   9/27/23     Date of Surgery: 7/25/23     Insurance Primary/Secondary: [facility] [place] / N/A     # Auth Visits: 10            HPI: Mark Madden is a 66year old female who presents to therapy today s/p right TKA on 4/25/23 and left TKA on 7/25/23 with complaints tightness in the left quad and calf, and decreased activity tolerance. She has been completing her home PT exercises daily, which alleviates symptoms, along with completing self massage. She noted a mild loss of feeling near the lateral joint line on right knee, though it is improving. Pt wanted to get both knees done in small amount of time. Completed therapy on right knee toward middle of June. Pt describes pain level current 1/10, at best 1/10, at worst 1/10. Current functional limitations include walking and stairs, and prolonged standing and walking activities such as household chores. Subjective: Pt is feeling good today, wasn't able to complete her exercises yesterday as she accompanied her  at doctors appointments. Pain: 0/10       Objective: L knee flexion ROM: 116 deg    Assessment: Pt tolerated session well, and was able to complete all planned exercises. She continued to struggle with stair negotiation but was able to ascend the 6\" step though relied heavily on the railings. Will continue to target functional LE strength and walking mechanics. Goals:   Goals: (To be met in 12 visits)   Pt will improve knee AROM flexion to >120 degrees to improve ability to perform stair negotiation.   Pt will improve quad strength to 5/5 to ascend 1 flight of stairs reciprocally without UE assist  Pt will increase hip and knee strength to grossly 5/5 to be able to get up and down from the floor safely  Pt will demonstrate increased hip ER/ABD strength to 4+/5 to perform stepping and squatting activities without excessive femoral IR/ADD  Pt will improve SLS to >6s to improve safety and independence with gait on uneven surfaces such as grass  Pt will be independent and compliant with comprehensive HEP to maintain progress achieved in PT    Plan: Continue to increase L knee flexion ROM, quad activation, and gait mechanics.  Bridging, sit to stand, mini squat, standing heel raises, slandboard stretch next session    Date: 9/1/2023  TX#: 2/10 Date: 9/8/2023    TX#: 3/10 Date: 9/11/2023    TX#: 4/10 Date: 9/13/2023    TX#: 5/10 Date: 9/20/2023    TX#: 6/10 Date: 9/22/23    Tx#: 7/10 Date: 9/26/23  Tx#: 8/10   HEP         Provided at [facility]   Standing hip flex stretch       Manual Therapy         STM  Gastroc/soleus  HS  Quad  Mob  G2-3 patellar all directions    10' STM: Gastroc/soleus, HS, Quad  Mob: G2-3 patellar all directions    20' STM: Gastroc/soleus, HS, Quad  Mob: G2-3 patellar all directions, G3 AP tibiofemoral jt    15' STM: R quad with modified damion test stretch     8'      Therapeutic Exercise         PROM knee   Heel slides x10,5with overpressure from PT  DKTC with SB x3'  SAQ x10 R, 2x10 L  Standing TKE   Standing heel raises with UE support    23' -Reassessment   Heel slides 15x5\"   - Seated HS stretch  - Standing HS stretch on step  -Knee flexion on step  -Pt edu: importance of functional knee flex ROM, anatomy of quad/HS, and plan for progression     x25' Nustep level 3 5'  - Knee flexion on step   - Seated knee flexion with strap x5  - HS sets 2x10 5\" hold  - SAQ 2x10   - Sit to stands elevated able intermittent use of UE    30' (25')   Nustep level 4 5' focus on active knee flex and ext   - Standing Hip flexor stretch  - Sit to stands elevated table x5  -Standing TKE BlueTB 2x10 3\" hold  -Standing heel raises 3x10  24'     Nustep level 4 10' cardio warm up (5' unsupervised)   - Standing Hip flexor stretch  - Sit to stands elevated table 2x5  -Standing heel raises 3x10  -Side steps at / x3  -Supine heel slides   -LTR x20  -Supine marching focus on TA contraction x20    30' (25') Nustep level 4 10 min  -Step up 4\"  -Step down 2-4\"      25'  Nustep level 5'  -Step up 4\" x10 B  -Step up 6\" x5 B (SBA w L)  -Step down 4\" x5 B   -Standing hip flexor stretch on step x10    25'    Therapeutic Activity         Walking: lap around clinic with 2ww, emphasis on L knee ROM   Walking fwd/bwd in parallel bars- emphasis  on knee ROM   Side steps at // bars- cues to increase knee flexion     8' -Replicate getting in and out of car   x5'        Neuromuscular Re-ed            -Gait mechanics: increased knee flexion during swing and heel-toe contact   -Pt edu: improved gait mechanics   8' Gait mechanics: increased knee flexion during swing and heel-toe contact   -Pt edu: improved gait mechanics  Sit to stand x10 with quad set    15' 15'    -Sit to stand x5 with quad set    -Paloff press BTB 2x10 B- too painful on back   -Seated marching with TA contaction   -Standing hip arcs with slider x5B  -Standing hip ext with slider x5 B  18 min    -Sit to stand x10 with quad set  (elevated table)  -Side stepping over 3 hurdles at ballet bar x3 laps  -Tiltboard M/L, A/P x2' ea (more difficulty with AP)     Charges: 2 TE, 1 NR    Total Timed Treatment: 43 min  Total Treatment Time: 43 min

## 2023-09-26 ENCOUNTER — OFFICE VISIT (OUTPATIENT)
Dept: PHYSICAL THERAPY | Facility: HOSPITAL | Age: 78
End: 2023-09-26
Attending: ORTHOPAEDIC SURGERY
Payer: MEDICARE

## 2023-09-26 PROCEDURE — 97110 THERAPEUTIC EXERCISES: CPT

## 2023-09-26 PROCEDURE — 97112 NEUROMUSCULAR REEDUCATION: CPT

## 2023-09-28 NOTE — PROGRESS NOTES
Diagnosis:     S/P total knee arthroplasty, right   Aftercare following left knee joint replacement surgery  Referring Provider: Darlin  Date of Evaluation:    8/30/2023    Precautions:  Fall Risk, Carlos A Jara Next MD visit:   9/27/23     Date of Surgery: 7/25/23     Insurance Primary/Secondary: [facility] / N/A     # Auth Visits: 10            HPI: Jelena Randle is a 66year old female who presents to therapy today s/p right TKA on 4/25/23 and left TKA on 7/25/23 with complaints tightness in the left quad and calf, and decreased activity tolerance. She has been completing her home PT exercises daily, which alleviates symptoms, along with completing self massage. She noted a mild loss of feeling near the lateral joint line on right knee, though it is improving. Pt wanted to get both knees done in small amount of time. Completed therapy on right knee toward middle of June. Pt describes pain level current 1/10, at best 1/10, at worst 1/10. Current functional limitations include walking and stairs, and prolonged standing and walking activities such as household chores. Subjective: Pt arrived 8 min late. Pt has f/u with surgeon on Wednesday and he was pleased with her progress. Pain: 0/10       Objective: L knee flexion ROM: 116 deg    Assessment: Pt has made good progress with PT and is feeling back to normal. She would like to be able to ambulate without an AD, therefore focused on balance and gait training today. Will plan to DC next session so pt can independently manage symptoms. Goals:   Goals: (To be met in 12 visits)   Pt will improve knee AROM flexion to >120 degrees to improve ability to perform stair negotiation.   Pt will improve quad strength to 5/5 to ascend 1 flight of stairs reciprocally without UE assist  Pt will increase hip and knee strength to grossly 5/5 to be able to get up and down from the floor safely  Pt will demonstrate increased hip ER/ABD strength to 4+/5 to perform stepping and squatting activities without excessive femoral IR/ADD  Pt will improve SLS to >6s to improve safety and independence with gait on uneven surfaces such as grass  Pt will be independent and compliant with comprehensive HEP to maintain progress achieved in PT    Plan: Continue to increase L knee flexion ROM, quad activation, and gait mechanics.  Bridging, sit to stand, mini squat, standing heel raises, slandboard stretch next session    Date: 9/1/2023  TX#: 2/10 Date: 9/8/2023    TX#: 3/10 Date: 9/11/2023    TX#: 4/10 Date: 9/13/2023    TX#: 5/10 Date: 9/20/2023    TX#: 6/10 Date: 9/22/23    Tx#: 7/10 Date: 9/26/23  Tx#: 8/10 Date: 9/29/23  Tx#: 9/10   HEP         Provided at [facility]   Standing hip flex stretch        Manual Therapy         STM  Gastroc/soleus  HS  Quad  Mob  G2-3 patellar all directions    10' STM: Gastroc/soleus, HS, Quad  Mob: G2-3 patellar all directions    20' STM: Gastroc/soleus, HS, Quad  Mob: G2-3 patellar all directions, G3 AP tibiofemoral jt    15' STM: R quad with modified damion test stretch     8'       Therapeutic Exercise         PROM knee   Heel slides x10,5with overpressure from PT  DKTC with SB x3'  SAQ x10 R, 2x10 L  Standing TKE   Standing heel raises with UE support    23' -Reassessment   Heel slides 15x5\"   - Seated HS stretch  - Standing HS stretch on step  -Knee flexion on step  -Pt edu: importance of functional knee flex ROM, anatomy of quad/HS, and plan for progression     x25' Nustep level 3 5'  - Knee flexion on step   - Seated knee flexion with strap x5  - HS sets 2x10 5\" hold  - SAQ 2x10   - Sit to stands elevated able intermittent use of UE    30' (25')   Nustep level 4 5' focus on active knee flex and ext   - Standing Hip flexor stretch  - Sit to stands elevated table x5  -Standing TKE BlueTB 2x10 3\" hold  -Standing heel raises 3x10  24'     Nustep level 4 10' cardio warm up (5' unsupervised)   - Standing Hip flexor stretch  - Sit to stands elevated table 2x5  -Standing heel raises 3x10  -Side steps at / x3  -Supine heel slides   -LTR x20  -Supine marching focus on TA contraction x20    30' (25') Nustep level 4 10 min  -Step up 4\"  -Step down 2-4\"      25'  Nustep level 5'  -Step up 4\" x10 B  -Step up 6\" x5 B (SBA w L)  -Step down 4\" x5 B   -Standing hip flexor stretch on step x10    25'  Nustep level 10' lvl 6  -Standing hip flexor stretch on step x10 B  -Pt edu: plan     13'    Therapeutic Activity         Walking: lap around clinic with 2ww, emphasis on L knee ROM   Walking fwd/bwd in parallel bars- emphasis  on knee ROM   Side steps at // bars- cues to increase knee flexion     8' -Replicate getting in and out of car   x5'         Neuromuscular Re-ed            -Gait mechanics: increased knee flexion during swing and heel-toe contact   -Pt edu: improved gait mechanics   8' Gait mechanics: increased knee flexion during swing and heel-toe contact   -Pt edu: improved gait mechanics  Sit to stand x10 with quad set    15' 15'    -Sit to stand x5 with quad set    -Paloff press BTB 2x10 B- too painful on back   -Seated marching with TA contaction   -Standing hip arcs with slider x5B  -Standing hip ext with slider x5 B  18 min    -Sit to stand x10 with quad set  (elevated table)  -Side stepping over 3 hurdles at ballet bar x3 laps  -Tiltboard M/L, A/P x2' ea (more difficulty with AP) 23 min    -Toe tapping on stairs x4'  -Walking with SPC;  -Walking without AD  Pt ed: gait mechanics, use of LRAD       Charges: 1 TE, 2 NR    Total Timed Treatment: 38 min  Total Treatment Time: 38 min

## 2023-09-29 ENCOUNTER — OFFICE VISIT (OUTPATIENT)
Dept: PHYSICAL THERAPY | Facility: HOSPITAL | Age: 78
End: 2023-09-29
Attending: ORTHOPAEDIC SURGERY
Payer: MEDICARE

## 2023-09-29 PROCEDURE — 97110 THERAPEUTIC EXERCISES: CPT

## 2023-09-29 PROCEDURE — 97112 NEUROMUSCULAR REEDUCATION: CPT

## 2023-10-01 NOTE — PROGRESS NOTES
Discharge Summary  Pt has attended 10 visits in Physical Therapy. Diagnosis:     S/P total knee arthroplasty, right   Aftercare following left knee joint replacement surgery  Referring Provider: Darlin  Date of Evaluation:    8/30/2023    Precautions:  Fall Risk, WBAT Next MD visit: N/A     Date of Surgery: 7/25/23     Insurance Primary/Secondary: [facility] / N/A     # Auth Visits: 10            HPI: Anish Rico is a 66year old female who presents to therapy today s/p right TKA on 4/25/23 and left TKA on 7/25/23 with complaints tightness in the left quad and calf, and decreased activity tolerance. She has been completing her home PT exercises daily, which alleviates symptoms, along with completing self massage. She noted a mild loss of feeling near the lateral joint line on right knee, though it is improving. Pt wanted to get both knees done in small amount of time. Completed therapy on right knee toward middle of June. Pt describes pain level current 1/10, at best 1/10, at worst 1/10. Current functional limitations include walking and stairs, and prolonged standing and walking activities such as household chores. Subjective: Pt is feeling good, no pain in the knee. Is ready for today to be the last visit. Pain: 0/10       Objective:    AROM: (* denotes performed with pain)   Knee    Flexion: R 116; L 116   Extension: R 0; L 0         Strength/MMT: (* denotes performed with pain)  Hip Knee   Flexion: R 4+/5; L 4/5 (pulling)  Extension: R NT/5; L NT/5  Abduction: R NT/5; L NT/5  ER: R 5/5; L 5+/5 Flexion: R 5-/5; L 5-/5  Extension: R 5/5; L 5/5             Gait: pt ambulates on level ground with assistive device of 4ww, improved knee flexion, slight trunk flexion (more related to low back pain)    Assessment: Pt has made good progress with PT and is feeling back to normal. Her strength has improved and her knee ROM is within a functional range.  She continues to struggle with stair negotiation without use of railings, but has been continuing to work on it at home. She's been able to ambulate without an AD within her home and primarily uses the 2ww with community ambulation for safety, as she continues to have decreased stamina. She continues to feel pulling along her left hip flexors, encouraged her to continue stretches and strengthening exercises. Advised pt to call if she has any remaining questions or concerns. Goals:   Goals: (To be met in 12 visits)   Pt will improve knee AROM flexion to >120 degrees to improve ability to perform stair negotiation. Almost met (equal L=R)  Pt will improve quad strength to 5/5 to ascend 1 flight of stairs reciprocally without UE assist improved (need UE support)  Pt will increase hip and knee strength to grossly 5/5 to be able to get up and down from the floor safely improved  Pt will demonstrate increased hip ER/ABD strength to 4+/5 to perform stepping and squatting activities without excessive femoral IR/ADD met  Pt will improve SLS to >6s to improve safety and independence with gait on uneven surfaces such as grass not met   Pt will be independent and compliant with comprehensive HEP to maintain progress achieved in PT met    LEFS Score  LEFS Score: 36.25 % (8/30/2023 10:48 AM)    Post LEFS Score  Post LEFS Score: 61.25 % (10/3/2023 11:06 AM)    25 % improvement      Thank you for your referral. If you have any questions, please contact me at Dept: [phone].     Sincerely,  Electronically signed by therapist: Julissa Cai, PT     Certification From: 90/7/2309  To:12/30/2023     Date: 9/1/2023  TX#: 2/10 Date: 9/8/2023    TX#: 3/10 Date: 9/11/2023    TX#: 4/10 Date: 9/13/2023    TX#: 5/10 Date: 9/20/2023    TX#: 6/10 Date: 9/22/23    Tx#: 7/10 Date: 9/26/23  Tx#: 8/10 Date: 9/29/23  Tx#: 9/10 Date: 10/3/23  Tx#: 10/10   HEP         Provided at [facility]   Standing hip flex stretch         Manual Therapy         STM  Gastroc/soleus  HS  Quad  Mob  G2-3 patellar all directions    10' STM: Gastroc/soleus, HS, Quad  Mob: G2-3 patellar all directions    20' STM: Gastroc/soleus, HS, Quad  Mob: G2-3 patellar all directions, G3 AP tibiofemoral jt    15' STM: R quad with modified damion test stretch     8'        Therapeutic Exercise         PROM knee   Heel slides x10,5with overpressure from PT  DKTC with SB x3'  SAQ x10 R, 2x10 L  Standing TKE   Standing heel raises with UE support    23' -Reassessment   Heel slides 15x5\"   - Seated HS stretch  - Standing HS stretch on step  -Knee flexion on step  -Pt edu: importance of functional knee flex ROM, anatomy of quad/HS, and plan for progression     x25' Nustep level 3 5'  - Knee flexion on step   - Seated knee flexion with strap x5  - HS sets 2x10 5\" hold  - SAQ 2x10   - Sit to stands elevated able intermittent use of UE    30' (25')   Nustep level 4 5' focus on active knee flex and ext   - Standing Hip flexor stretch  - Sit to stands elevated table x5  -Standing TKE BlueTB 2x10 3\" hold  -Standing heel raises 3x10  24'     Nustep level 4 10' cardio warm up (5' unsupervised)   - Standing Hip flexor stretch  - Sit to stands elevated table 2x5  -Standing heel raises 3x10  -Side steps at / x3  -Supine heel slides   -LTR x20  -Supine marching focus on TA contraction x20    30' (25') Nustep level 4 10 min  -Step up 4\"  -Step down 2-4\"      25'  Nustep level 5'  -Step up 4\" x10 B  -Step up 6\" x5 B (SBA w L)  -Step down 4\" x5 B   -Standing hip flexor stretch on step x10    25'  Nustep level 10' lvl 6  -Standing hip flexor stretch on step x10 B  -Pt edu: plan     13'  25'  Nustep lvl 5 10'  Reassessment  Stair negotiation 4 and 6\" UE support needed for balance and for assist when ascending with L  Pt edu: HEP review, plan for discharge    Therapeutic Activity         Walking: lap around clinic with 2ww, emphasis on L knee ROM   Walking fwd/bwd in parallel bars- emphasis  on knee ROM   Side steps at // bars- cues to increase knee flexion 8' -Replicate getting in and out of car   x5'          Neuromuscular Re-ed            -Gait mechanics: increased knee flexion during swing and heel-toe contact   -Pt edu: improved gait mechanics   8' Gait mechanics: increased knee flexion during swing and heel-toe contact   -Pt edu: improved gait mechanics  Sit to stand x10 with quad set    15' 15'    -Sit to stand x5 with quad set    -Paloff press BTB 2x10 B- too painful on back   -Seated marching with TA contaction   -Standing hip arcs with slider x5B  -Standing hip ext with slider x5 B  18 min    -Sit to stand x10 with quad set  (elevated table)  -Side stepping over 3 hurdles at ballet bar x3 laps  -Tiltboard M/L, A/P x2' ea (more difficulty with AP) 23 min    -Toe tapping on stairs x4'  -Walking with [facility];  -Walking without AD  Pt ed: gait mechanics, use of LRAD   NR 15'    Walking in clinic with SPC 3 laps   Pt edu: gait mechanics and use of LRAD       Charges: 2 TE, 1 NR    Total Timed Treatment: 40 min  Total Treatment Time: 40 min

## 2023-10-03 ENCOUNTER — OFFICE VISIT (OUTPATIENT)
Dept: PHYSICAL THERAPY | Facility: HOSPITAL | Age: 78
End: 2023-10-03
Attending: ORTHOPAEDIC SURGERY
Payer: MEDICARE

## 2023-10-03 PROCEDURE — 97110 THERAPEUTIC EXERCISES: CPT

## 2023-10-03 PROCEDURE — 97112 NEUROMUSCULAR REEDUCATION: CPT

## 2023-10-06 ENCOUNTER — APPOINTMENT (OUTPATIENT)
Dept: PHYSICAL THERAPY | Facility: HOSPITAL | Age: 78
End: 2023-10-06
Attending: ORTHOPAEDIC SURGERY
Payer: MEDICARE

## 2025-08-17 ENCOUNTER — APPOINTMENT (OUTPATIENT)
Dept: INTERVENTIONAL RADIOLOGY/VASCULAR | Facility: HOSPITAL | Age: 80
End: 2025-08-17
Attending: INTERNAL MEDICINE

## 2025-08-17 ENCOUNTER — APPOINTMENT (OUTPATIENT)
Dept: GENERAL RADIOLOGY | Facility: HOSPITAL | Age: 80
End: 2025-08-17
Attending: EMERGENCY MEDICINE

## 2025-08-17 ENCOUNTER — HOSPITAL ENCOUNTER (INPATIENT)
Facility: HOSPITAL | Age: 80
LOS: 2 days | Discharge: HOME | End: 2025-08-19
Attending: EMERGENCY MEDICINE | Admitting: HOSPITALIST

## 2025-08-17 DIAGNOSIS — R00.1 BRADYCARDIA: Primary | ICD-10-CM

## 2025-08-17 LAB
ALBUMIN SERPL-MCNC: 4.8 G/DL (ref 3.2–4.8)
ALP LIVER SERPL-CCNC: 98 U/L (ref 55–142)
ALT SERPL-CCNC: 17 U/L (ref 10–49)
ANION GAP SERPL CALC-SCNC: 18 MMOL/L (ref 0–18)
AST SERPL-CCNC: 37 U/L (ref ?–34)
ATRIAL RATE: 33 BPM
ATRIAL RATE: 78 BPM
BASOPHILS # BLD AUTO: 0.02 X10(3) UL (ref 0–0.2)
BASOPHILS NFR BLD AUTO: 0.2 %
BILIRUB DIRECT SERPL-MCNC: 0.3 MG/DL (ref ?–0.3)
BILIRUB SERPL-MCNC: 0.8 MG/DL (ref 0.2–1.1)
BUN BLD-MCNC: 30 MG/DL (ref 9–23)
BUN/CREAT SERPL: 18.3 (ref 10–20)
CALCIUM BLD-MCNC: 9.9 MG/DL (ref 8.7–10.4)
CHLORIDE SERPL-SCNC: 102 MMOL/L (ref 98–112)
CO2 SERPL-SCNC: 19 MMOL/L (ref 21–32)
CREAT BLD-MCNC: 1.64 MG/DL (ref 0.55–1.02)
DEPRECATED RDW RBC AUTO: 48.2 FL (ref 35.1–46.3)
EGFRCR SERPLBLD CKD-EPI 2021: 31 ML/MIN/1.73M2 (ref 60–?)
EOSINOPHIL # BLD AUTO: 0 X10(3) UL (ref 0–0.7)
EOSINOPHIL NFR BLD AUTO: 0 %
ERYTHROCYTE [DISTWIDTH] IN BLOOD BY AUTOMATED COUNT: 14.2 % (ref 11–15)
GLUCOSE BLD-MCNC: 149 MG/DL (ref 70–99)
GLUCOSE BLDC GLUCOMTR-MCNC: 148 MG/DL (ref 70–99)
HCT VFR BLD AUTO: 35.5 % (ref 35–48)
HGB BLD-MCNC: 11.9 G/DL (ref 12–16)
IMM GRANULOCYTES # BLD AUTO: 0.04 X10(3) UL (ref 0–1)
IMM GRANULOCYTES NFR BLD: 0.5 %
LIPASE SERPL-CCNC: 31 U/L (ref 12–53)
LYMPHOCYTES # BLD AUTO: 0.61 X10(3) UL (ref 1–4)
LYMPHOCYTES NFR BLD AUTO: 6.9 %
MCH RBC QN AUTO: 30.7 PG (ref 26–34)
MCHC RBC AUTO-ENTMCNC: 33.5 G/DL (ref 31–37)
MCV RBC AUTO: 91.7 FL (ref 80–100)
MONOCYTES # BLD AUTO: 0.23 X10(3) UL (ref 0.1–1)
MONOCYTES NFR BLD AUTO: 2.6 %
NEUTROPHILS # BLD AUTO: 7.96 X10 (3) UL (ref 1.5–7.7)
NEUTROPHILS # BLD AUTO: 7.96 X10(3) UL (ref 1.5–7.7)
NEUTROPHILS NFR BLD AUTO: 89.8 %
OSMOLALITY SERPL CALC.SUM OF ELEC: 297 MOSM/KG (ref 275–295)
P AXIS: 90 DEGREES
P-R INTERVAL: 266 MS
PLATELET # BLD AUTO: 120 10(3)UL (ref 150–450)
POTASSIUM SERPL-SCNC: 3.7 MMOL/L (ref 3.5–5.1)
PROT SERPL-MCNC: 7.6 G/DL (ref 5.7–8.2)
Q-T INTERVAL: 406 MS
Q-T INTERVAL: 530 MS
QRS DURATION: 136 MS
QRS DURATION: 150 MS
QTC CALCULATION (BEZET): 462 MS
QTC CALCULATION (BEZET): 463 MS
R AXIS: -4 DEGREES
R AXIS: 51 DEGREES
RBC # BLD AUTO: 3.87 X10(6)UL (ref 3.8–5.3)
SODIUM SERPL-SCNC: 139 MMOL/L (ref 136–145)
T AXIS: -1 DEGREES
T AXIS: 12 DEGREES
VENTRICULAR RATE: 46 BPM
VENTRICULAR RATE: 78 BPM
WBC # BLD AUTO: 8.9 X10(3) UL (ref 4–11)

## 2025-08-17 PROCEDURE — 99223 1ST HOSP IP/OBS HIGH 75: CPT

## 2025-08-17 PROCEDURE — 71045 X-RAY EXAM CHEST 1 VIEW: CPT | Performed by: EMERGENCY MEDICINE

## 2025-08-17 PROCEDURE — 02H63JZ INSERTION OF PACEMAKER LEAD INTO RIGHT ATRIUM, PERCUTANEOUS APPROACH: ICD-10-PCS | Performed by: INTERNAL MEDICINE

## 2025-08-17 PROCEDURE — 0JH607Z INSERTION OF CARDIAC RESYNCHRONIZATION PACEMAKER PULSE GENERATOR INTO CHEST SUBCUTANEOUS TISSUE AND FASCIA, OPEN APPROACH: ICD-10-PCS | Performed by: INTERNAL MEDICINE

## 2025-08-17 PROCEDURE — 02HK3JZ INSERTION OF PACEMAKER LEAD INTO RIGHT VENTRICLE, PERCUTANEOUS APPROACH: ICD-10-PCS | Performed by: INTERNAL MEDICINE

## 2025-08-17 RX ORDER — ASPIRIN 81 MG/1
81 TABLET ORAL DAILY
Status: DISCONTINUED | OUTPATIENT
Start: 2025-08-18 | End: 2025-08-19

## 2025-08-17 RX ORDER — ASPIRIN 81 MG/1
81 TABLET ORAL DAILY
COMMUNITY

## 2025-08-17 RX ORDER — METHOHEXITAL IN WATER/PF 100MG/10ML
SYRINGE (ML) INTRAVENOUS
Status: COMPLETED
Start: 2025-08-17 | End: 2025-08-17

## 2025-08-17 RX ORDER — AMLODIPINE BESYLATE 5 MG/1
5 TABLET ORAL DAILY
Status: ON HOLD | COMMUNITY
End: 2025-08-17 | Stop reason: ALTCHOICE

## 2025-08-17 RX ORDER — DOBUTAMINE HYDROCHLORIDE 200 MG/100ML
INJECTION INTRAVENOUS CONTINUOUS
Status: DISCONTINUED | OUTPATIENT
Start: 2025-08-17 | End: 2025-08-17

## 2025-08-17 RX ORDER — POLYETHYLENE GLYCOL 3350 17 G/17G
17 POWDER, FOR SOLUTION ORAL DAILY PRN
Status: DISCONTINUED | OUTPATIENT
Start: 2025-08-17 | End: 2025-08-19

## 2025-08-17 RX ORDER — ONDANSETRON 2 MG/ML
4 INJECTION INTRAMUSCULAR; INTRAVENOUS EVERY 6 HOURS PRN
Status: DISCONTINUED | OUTPATIENT
Start: 2025-08-17 | End: 2025-08-19

## 2025-08-17 RX ORDER — AMIODARONE HYDROCHLORIDE 50 MG/ML
INJECTION, SOLUTION INTRAVENOUS
Status: COMPLETED
Start: 2025-08-17 | End: 2025-08-17

## 2025-08-17 RX ORDER — IOPAMIDOL 612 MG/ML
30 INJECTION, SOLUTION INTRAVASCULAR
Status: DISCONTINUED | OUTPATIENT
Start: 2025-08-17 | End: 2025-08-19

## 2025-08-17 RX ORDER — LOSARTAN POTASSIUM 25 MG/1
25 TABLET ORAL DAILY
COMMUNITY

## 2025-08-17 RX ORDER — BISACODYL 10 MG
10 SUPPOSITORY, RECTAL RECTAL
Status: DISCONTINUED | OUTPATIENT
Start: 2025-08-17 | End: 2025-08-19

## 2025-08-17 RX ORDER — ONDANSETRON 2 MG/ML
4 INJECTION INTRAMUSCULAR; INTRAVENOUS ONCE
Status: COMPLETED | OUTPATIENT
Start: 2025-08-17 | End: 2025-08-17

## 2025-08-17 RX ORDER — SENNOSIDES 8.6 MG
17.2 TABLET ORAL NIGHTLY PRN
Status: DISCONTINUED | OUTPATIENT
Start: 2025-08-17 | End: 2025-08-19

## 2025-08-17 RX ORDER — ACETAMINOPHEN 500 MG
1000 TABLET ORAL EVERY 6 HOURS PRN
COMMUNITY

## 2025-08-17 RX ORDER — LOSARTAN POTASSIUM 25 MG/1
25 TABLET ORAL DAILY
Status: DISCONTINUED | OUTPATIENT
Start: 2025-08-18 | End: 2025-08-19

## 2025-08-17 RX ORDER — LIDOCAINE HYDROCHLORIDE AND EPINEPHRINE 10; 10 MG/ML; UG/ML
INJECTION, SOLUTION INFILTRATION; PERINEURAL
Status: COMPLETED
Start: 2025-08-17 | End: 2025-08-17

## 2025-08-17 RX ORDER — AMIODARONE HYDROCHLORIDE 200 MG/1
400 TABLET ORAL DAILY
Status: DISCONTINUED | OUTPATIENT
Start: 2025-08-17 | End: 2025-08-19

## 2025-08-17 RX ORDER — ACETAMINOPHEN 500 MG
1000 TABLET ORAL EVERY 6 HOURS PRN
Status: DISCONTINUED | OUTPATIENT
Start: 2025-08-17 | End: 2025-08-19

## 2025-08-17 RX ORDER — ROSUVASTATIN CALCIUM 20 MG/1
20 TABLET, COATED ORAL NIGHTLY
COMMUNITY

## 2025-08-17 RX ORDER — MIDAZOLAM HYDROCHLORIDE 1 MG/ML
INJECTION INTRAMUSCULAR; INTRAVENOUS
Status: COMPLETED
Start: 2025-08-17 | End: 2025-08-17

## 2025-08-17 RX ORDER — DOPAMINE HYDROCHLORIDE 320 MG/100ML
INJECTION, SOLUTION INTRAVENOUS CONTINUOUS
Status: DISCONTINUED | OUTPATIENT
Start: 2025-08-17 | End: 2025-08-18

## 2025-08-17 RX ORDER — FUROSEMIDE 20 MG/1
20 TABLET ORAL DAILY
COMMUNITY

## 2025-08-17 RX ORDER — MULTIVITAMIN
1 TABLET ORAL DAILY
COMMUNITY

## 2025-08-17 RX ORDER — IOPAMIDOL 612 MG/ML
30 INJECTION, SOLUTION INTRAVASCULAR
Status: COMPLETED | OUTPATIENT
Start: 2025-08-17 | End: 2025-08-17

## 2025-08-17 RX ORDER — MIDAZOLAM HYDROCHLORIDE 1 MG/ML
INJECTION INTRAMUSCULAR; INTRAVENOUS
Status: DISCONTINUED
Start: 2025-08-17 | End: 2025-08-17 | Stop reason: WASHOUT

## 2025-08-17 RX ORDER — FUROSEMIDE 20 MG/1
20 TABLET ORAL DAILY
Status: DISCONTINUED | OUTPATIENT
Start: 2025-08-18 | End: 2025-08-19

## 2025-08-17 RX ORDER — CLOPIDOGREL BISULFATE 75 MG/1
75 TABLET ORAL DAILY
Status: DISCONTINUED | OUTPATIENT
Start: 2025-08-18 | End: 2025-08-19

## 2025-08-17 RX ORDER — MELATONIN
1000 EVERY OTHER DAY
COMMUNITY

## 2025-08-17 RX ORDER — ROSUVASTATIN CALCIUM 20 MG/1
20 TABLET, COATED ORAL NIGHTLY
Status: DISCONTINUED | OUTPATIENT
Start: 2025-08-17 | End: 2025-08-19

## 2025-08-17 RX ORDER — ASCORBIC ACID 500 MG
500 TABLET ORAL DAILY
COMMUNITY

## 2025-08-17 RX ORDER — CLOPIDOGREL BISULFATE 75 MG/1
75 TABLET ORAL DAILY
COMMUNITY

## 2025-08-18 LAB
ALBUMIN SERPL-MCNC: 3.9 G/DL (ref 3.2–4.8)
ALBUMIN/GLOB SERPL: 1.8 (ref 1–2)
ALP LIVER SERPL-CCNC: 78 U/L (ref 55–142)
ALT SERPL-CCNC: 7 U/L (ref 10–49)
ANION GAP SERPL CALC-SCNC: 8 MMOL/L (ref 0–18)
AST SERPL-CCNC: 28 U/L (ref ?–34)
BASOPHILS # BLD AUTO: 0.02 X10(3) UL (ref 0–0.2)
BASOPHILS NFR BLD AUTO: 0.2 %
BILIRUB SERPL-MCNC: 0.6 MG/DL (ref 0.2–1.1)
BUN BLD-MCNC: 41 MG/DL (ref 9–23)
BUN/CREAT SERPL: 22.8 (ref 10–20)
CALCIUM BLD-MCNC: 9.1 MG/DL (ref 8.7–10.4)
CHLORIDE SERPL-SCNC: 105 MMOL/L (ref 98–112)
CO2 SERPL-SCNC: 24 MMOL/L (ref 21–32)
CREAT BLD-MCNC: 1.8 MG/DL (ref 0.55–1.02)
DEPRECATED RDW RBC AUTO: 48.2 FL (ref 35.1–46.3)
EGFRCR SERPLBLD CKD-EPI 2021: 28 ML/MIN/1.73M2 (ref 60–?)
EOSINOPHIL # BLD AUTO: 0.03 X10(3) UL (ref 0–0.7)
EOSINOPHIL NFR BLD AUTO: 0.3 %
ERYTHROCYTE [DISTWIDTH] IN BLOOD BY AUTOMATED COUNT: 14.2 % (ref 11–15)
GLOBULIN PLAS-MCNC: 2.2 G/DL (ref 2–3.5)
GLUCOSE BLD-MCNC: 109 MG/DL (ref 70–99)
HCT VFR BLD AUTO: 29.6 % (ref 35–48)
HGB BLD-MCNC: 10 G/DL (ref 12–16)
IMM GRANULOCYTES # BLD AUTO: 0.09 X10(3) UL (ref 0–1)
IMM GRANULOCYTES NFR BLD: 0.9 %
LYMPHOCYTES # BLD AUTO: 1.37 X10(3) UL (ref 1–4)
LYMPHOCYTES NFR BLD AUTO: 13.4 %
MAGNESIUM SERPL-MCNC: 2.1 MG/DL (ref 1.6–2.6)
MCH RBC QN AUTO: 31.2 PG (ref 26–34)
MCHC RBC AUTO-ENTMCNC: 33.8 G/DL (ref 31–37)
MCV RBC AUTO: 92.2 FL (ref 80–100)
MONOCYTES # BLD AUTO: 0.96 X10(3) UL (ref 0.1–1)
MONOCYTES NFR BLD AUTO: 9.4 %
MRSA DNA SPEC QL NAA+PROBE: NEGATIVE
NEUTROPHILS # BLD AUTO: 7.77 X10 (3) UL (ref 1.5–7.7)
NEUTROPHILS # BLD AUTO: 7.77 X10(3) UL (ref 1.5–7.7)
NEUTROPHILS NFR BLD AUTO: 75.8 %
OSMOLALITY SERPL CALC.SUM OF ELEC: 295 MOSM/KG (ref 275–295)
PLATELET # BLD AUTO: 107 10(3)UL (ref 150–450)
POTASSIUM SERPL-SCNC: 3.4 MMOL/L (ref 3.5–5.1)
PROT SERPL-MCNC: 6.1 G/DL (ref 5.7–8.2)
RBC # BLD AUTO: 3.21 X10(6)UL (ref 3.8–5.3)
SODIUM SERPL-SCNC: 137 MMOL/L (ref 136–145)
WBC # BLD AUTO: 10.2 X10(3) UL (ref 4–11)

## 2025-08-18 PROCEDURE — 99233 SBSQ HOSP IP/OBS HIGH 50: CPT | Performed by: INTERNAL MEDICINE

## 2025-08-18 RX ORDER — POTASSIUM CHLORIDE 1.5 G/1.58G
40 POWDER, FOR SOLUTION ORAL ONCE
Status: COMPLETED | OUTPATIENT
Start: 2025-08-18 | End: 2025-08-18

## 2025-08-19 ENCOUNTER — APPOINTMENT (OUTPATIENT)
Dept: GENERAL RADIOLOGY | Facility: HOSPITAL | Age: 80
End: 2025-08-19
Attending: NURSE PRACTITIONER

## 2025-08-19 VITALS
HEART RATE: 69 BPM | DIASTOLIC BLOOD PRESSURE: 62 MMHG | TEMPERATURE: 99 F | BODY MASS INDEX: 29 KG/M2 | SYSTOLIC BLOOD PRESSURE: 153 MMHG | OXYGEN SATURATION: 92 % | WEIGHT: 203.06 LBS | RESPIRATION RATE: 22 BRPM

## 2025-08-19 LAB
ANION GAP SERPL CALC-SCNC: 7 MMOL/L (ref 0–18)
ATRIAL RATE: 62 BPM
BASOPHILS # BLD AUTO: 0.01 X10(3) UL (ref 0–0.2)
BASOPHILS NFR BLD AUTO: 0.1 %
BUN BLD-MCNC: 36 MG/DL (ref 9–23)
BUN/CREAT SERPL: 25 (ref 10–20)
CALCIUM BLD-MCNC: 9.2 MG/DL (ref 8.7–10.4)
CHLORIDE SERPL-SCNC: 107 MMOL/L (ref 98–112)
CO2 SERPL-SCNC: 24 MMOL/L (ref 21–32)
CREAT BLD-MCNC: 1.44 MG/DL (ref 0.55–1.02)
DEPRECATED RDW RBC AUTO: 49 FL (ref 35.1–46.3)
EGFRCR SERPLBLD CKD-EPI 2021: 37 ML/MIN/1.73M2 (ref 60–?)
EOSINOPHIL # BLD AUTO: 0.11 X10(3) UL (ref 0–0.7)
EOSINOPHIL NFR BLD AUTO: 1.4 %
ERYTHROCYTE [DISTWIDTH] IN BLOOD BY AUTOMATED COUNT: 14.4 % (ref 11–15)
GLUCOSE BLD-MCNC: 97 MG/DL (ref 70–99)
HCT VFR BLD AUTO: 30.6 % (ref 35–48)
HGB BLD-MCNC: 9.9 G/DL (ref 12–16)
IMM GRANULOCYTES # BLD AUTO: 0.02 X10(3) UL (ref 0–1)
IMM GRANULOCYTES NFR BLD: 0.3 %
LYMPHOCYTES # BLD AUTO: 1.55 X10(3) UL (ref 1–4)
LYMPHOCYTES NFR BLD AUTO: 20.4 %
MAGNESIUM SERPL-MCNC: 2.1 MG/DL (ref 1.6–2.6)
MCH RBC QN AUTO: 29.6 PG (ref 26–34)
MCHC RBC AUTO-ENTMCNC: 32.4 G/DL (ref 31–37)
MCV RBC AUTO: 91.6 FL (ref 80–100)
MONOCYTES # BLD AUTO: 0.75 X10(3) UL (ref 0.1–1)
MONOCYTES NFR BLD AUTO: 9.9 %
NEUTROPHILS # BLD AUTO: 5.16 X10 (3) UL (ref 1.5–7.7)
NEUTROPHILS # BLD AUTO: 5.16 X10(3) UL (ref 1.5–7.7)
NEUTROPHILS NFR BLD AUTO: 67.9 %
OSMOLALITY SERPL CALC.SUM OF ELEC: 294 MOSM/KG (ref 275–295)
P AXIS: 39 DEGREES
P-R INTERVAL: 134 MS
PLATELET # BLD AUTO: 111 10(3)UL (ref 150–450)
POTASSIUM SERPL-SCNC: 4 MMOL/L (ref 3.5–5.1)
POTASSIUM SERPL-SCNC: 4 MMOL/L (ref 3.5–5.1)
Q-T INTERVAL: 478 MS
QRS DURATION: 150 MS
QTC CALCULATION (BEZET): 485 MS
R AXIS: 86 DEGREES
RBC # BLD AUTO: 3.34 X10(6)UL (ref 3.8–5.3)
SODIUM SERPL-SCNC: 138 MMOL/L (ref 136–145)
T AXIS: 21 DEGREES
VENTRICULAR RATE: 62 BPM
WBC # BLD AUTO: 7.6 X10(3) UL (ref 4–11)

## 2025-08-19 PROCEDURE — 99232 SBSQ HOSP IP/OBS MODERATE 35: CPT | Performed by: INTERNAL MEDICINE

## 2025-08-19 PROCEDURE — 71045 X-RAY EXAM CHEST 1 VIEW: CPT | Performed by: NURSE PRACTITIONER

## 2025-08-27 ENCOUNTER — LAB ENCOUNTER (OUTPATIENT)
Dept: LAB | Facility: HOSPITAL | Age: 80
End: 2025-08-27
Attending: NURSE PRACTITIONER

## 2025-08-27 DIAGNOSIS — R00.1 BRADYCARDIA: ICD-10-CM

## 2025-08-27 LAB
ANION GAP SERPL CALC-SCNC: 7 MMOL/L (ref 0–18)
BUN BLD-MCNC: 22 MG/DL (ref 9–23)
BUN/CREAT SERPL: 13.2 (ref 10–20)
CALCIUM BLD-MCNC: 9.8 MG/DL (ref 8.7–10.4)
CHLORIDE SERPL-SCNC: 109 MMOL/L (ref 98–112)
CO2 SERPL-SCNC: 25 MMOL/L (ref 21–32)
CREAT BLD-MCNC: 1.67 MG/DL (ref 0.55–1.02)
EGFRCR SERPLBLD CKD-EPI 2021: 31 ML/MIN/1.73M2 (ref 60–?)
FASTING STATUS PATIENT QL REPORTED: YES
GLUCOSE BLD-MCNC: 97 MG/DL (ref 70–99)
OSMOLALITY SERPL CALC.SUM OF ELEC: 295 MOSM/KG (ref 275–295)
POTASSIUM SERPL-SCNC: 4.8 MMOL/L (ref 3.5–5.1)
SODIUM SERPL-SCNC: 141 MMOL/L (ref 136–145)

## 2025-08-27 PROCEDURE — 80048 BASIC METABOLIC PNL TOTAL CA: CPT

## 2025-08-27 PROCEDURE — 36415 COLL VENOUS BLD VENIPUNCTURE: CPT

## (undated) DEVICE — SOL LACT RINGERS 1000ML

## (undated) DEVICE — LAP CHOLE: Brand: MEDLINE INDUSTRIES, INC.

## (undated) DEVICE — LIGAMAX 5 MM ENDOSCOPIC MULTIPLE CLIP APPLIER: Brand: LIGAMAX

## (undated) DEVICE — DISPOSABLE LAPAROSCOPIC CLIP APPLIER WITH 20 CLIPS.: Brand: EPIX® UNIVERSAL CLIP APPLIER

## (undated) DEVICE — JAGWIRE STRGHT TIP .025X450CM

## (undated) DEVICE — TROCAR: Brand: KII FIOS FIRST ENTRY

## (undated) DEVICE — [HIGH FLOW INSUFFLATOR,  DO NOT USE IF PACKAGE IS DAMAGED,  KEEP DRY,  KEEP AWAY FROM SUNLIGHT,  PROTECT FROM HEAT AND RADIOACTIVE SOURCES.]: Brand: PNEUMOSURE

## (undated) DEVICE — TISSUE RETRIEVAL SYSTEM: Brand: INZII RETRIEVAL SYSTEM

## (undated) DEVICE — SUTURE VICRYL 0 J906G

## (undated) DEVICE — RETRIEVAL BALLOON CATHETER: Brand: EXTRACTOR™ PRO RX

## (undated) DEVICE — STERILE LATEX POWDER-FREE SURGICAL GLOVESWITH NITRILE COATING: Brand: PROTEXIS

## (undated) DEVICE — SYRINGE 10ML LL CONTRL SYRINGE

## (undated) DEVICE — UNDYED BRAIDED (POLYGLACTIN 910), SYNTHETIC ABSORBABLE SUTURE: Brand: COATED VICRYL

## (undated) DEVICE — GRASPER LAP MINI ALLIGATOR

## (undated) DEVICE — SOL  .9 1000ML BTL

## (undated) DEVICE — SPHINCTEROTOME: Brand: HYDRATOME RX 44

## (undated) DEVICE — Device: Brand: DEFENDO AIR/WATER/SUCTION AND BIOPSY VALVE

## (undated) DEVICE — ENDOSCOPY PACK UPPER: Brand: MEDLINE INDUSTRIES, INC.

## (undated) DEVICE — INSUFFLATION NEEDLE TO ESTABLISH PNEUMOPERITONEUM.: Brand: INSUFFLATION NEEDLE

## (undated) DEVICE — RETRIEVAL BALLOON CATHETER: Brand: EXTRACTOR™ PRO XL

## (undated) NOTE — LETTER
No referring provider defined for this encounter. 07/23/18        Patient: Martin Correia   YOB: 1945   Date of Visit: 7/23/2018       Dear  Dr. Minor Baker MD,      Thank you for referring Martin Correia to my practice.   Please

## (undated) NOTE — LETTER
[facility]  Administration of Anesthesia  1. Anu Victoria, or _________________________________ acting on her behalf, (Patient) (Dependent/Representative) request to receive anesthesia for my pending procedure/operation/treatment. infections, high spinal block, spinal bleeding, seizure, cardiac arrest and death. 7. AWARENESS: I understand that it is possible (but unlikely) to have explicit memory of events from the operating room while under general anesthesia.   8. ELECTROCONVULSIV unconscious pt /Relationship    My signature below affirms that prior to the time of the procedure, I have explained to the patient and/or his/her guardian, the risks and benefits of undergoing anesthesia, as well as any reasonable alternatives.     _______

## (undated) NOTE — LETTER
Patient Name: Mark Madden  YOB: 1945          MRN :  [id number]  Date:  10/4/2023  Referring Physician:  Moreno Saeed    Discharge Summary  Pt has attended 10 visits in Physical Therapy. Diagnosis:     S/P total knee arthroplasty, right   Aftercare following left knee joint replacement surgery  Referring Provider: Darlin  Date of Evaluation:    8/30/2023    Precautions:  Fall Risk, WBAT Next MD visit: N/A     Date of Surgery: 7/25/23     Insurance Primary/Secondary: [facility] / N/A     # Auth Visits: 10            HPI: Mark Madden is a 66year old female who presents to therapy today s/p right TKA on 4/25/23 and left TKA on 7/25/23 with complaints tightness in the left quad and calf, and decreased activity tolerance. She has been completing her home PT exercises daily, which alleviates symptoms, along with completing self massage. She noted a mild loss of feeling near the lateral joint line on right knee, though it is improving. Pt wanted to get both knees done in small amount of time. Completed therapy on right knee toward middle of June. Pt describes pain level current 1/10, at best 1/10, at worst 1/10. Current functional limitations include walking and stairs, and prolonged standing and walking activities such as household chores. Subjective: Pt is feeling good, no pain in the knee. Is ready for today to be the last visit.      Pain: 0/10       Objective:    AROM: (* denotes performed with pain)   Knee    Flexion: R 116; L 116   Extension: R 0; L 0         Strength/MMT: (* denotes performed with pain)  Hip Knee   Flexion: R 4+/5; L 4/5 (pulling)  Extension: R NT/5; L NT/5  Abduction: R NT/5; L NT/5  ER: R 5/5; L 5+/5 Flexion: R 5-/5; L 5-/5  Extension: R 5/5; L 5/5             Gait: pt ambulates on level ground with assistive device of 4ww, improved knee flexion, slight trunk flexion (more related to low back pain)    Assessment: Pt has made good progress with PT and is feeling back to normal. Her strength has improved and her knee ROM is within a functional range. She continues to struggle with stair negotiation without use of railings, but has been continuing to work on it at home. She's been able to ambulate without an AD within her home and primarily uses the 2ww with community ambulation for safety, as she continues to have decreased stamina. She continues to feel pulling along her left hip flexors, encouraged her to continue stretches and strengthening exercises. Advised pt to call if she has any remaining questions or concerns. Goals:   Goals: (To be met in 12 visits)   Pt will improve knee AROM flexion to >120 degrees to improve ability to perform stair negotiation. Almost met (equal L=R)  Pt will improve quad strength to 5/5 to ascend 1 flight of stairs reciprocally without UE assist improved (need UE support)  Pt will increase hip and knee strength to grossly 5/5 to be able to get up and down from the floor safely improved  Pt will demonstrate increased hip ER/ABD strength to 4+/5 to perform stepping and squatting activities without excessive femoral IR/ADD met  Pt will improve SLS to >6s to improve safety and independence with gait on uneven surfaces such as grass not met   Pt will be independent and compliant with comprehensive HEP to maintain progress achieved in PT met    LEFS Score  LEFS Score: 36.25 % (8/30/2023 10:48 AM)    Post LEFS Score  Post LEFS Score: 61.25 % (10/3/2023 11:06 AM)    25 % improvement      Thank you for your referral. If you have any questions, please contact me at Dept: [phone]. Sincerely,  Electronically signed by therapist: Katerina Bautista, PT     Certification From: 82/3/9876  To:12/30/2023            21st Century Cures Act Notice to Patient: Medical documents like this are made available to patients in the interest of transparency.  However, be advised this is a medical document and it is intended as [id number] communication between your medical providers. This medical document may contain abbreviations, assessments, medical data, and results or other terms that are unfamiliar. Medical documents are intended to carry relevant information, facts as evident, and the clinical opinion of the practitioner. As such, this medical document may be written in language that appears blunt or direct. You are encouraged to contact your medical provider and/or [facility] if you have any questions about this medical document.

## (undated) NOTE — LETTER
Hospital Discharge Documentation  Please phone to schedule a hospital follow up appointment.     From: [address] [facility] Office  Phone: [phone]    Patient discharged time/date: 7/7/2018  3:23 PM  Patient discharge disposition:  Home or Self General: No acute distress. Alert and oriented x 3. HEENT: Moist mucous membranes. EOM-I. PERRL  Neck: No lymphadenopathy. No JVD. No carotid bruits. Respiratory: Clear to auscultation bilaterally. No wheezes. No rhonchi.   Cardiovascular: S1, S2.  Regu She was formally evaluated by cardiology prior to surgery[RS. 2]    Consultations:[RS.1] Surgery, GI, Cardiology[RS. 2]    Procedures:[RS.1] See above[RS. 2]    Complications:[RS.1] none[RS. 2]    Discharge Condition:[RS.1] Stable[RS. 2]    Discharge Medication